# Patient Record
Sex: MALE | Race: WHITE | NOT HISPANIC OR LATINO | Employment: FULL TIME | URBAN - METROPOLITAN AREA
[De-identification: names, ages, dates, MRNs, and addresses within clinical notes are randomized per-mention and may not be internally consistent; named-entity substitution may affect disease eponyms.]

---

## 2017-07-08 ENCOUNTER — OFFICE VISIT (OUTPATIENT)
Dept: URGENT CARE | Facility: URGENT CARE | Age: 22
End: 2017-07-08
Payer: COMMERCIAL

## 2017-07-08 ENCOUNTER — HOSPITAL ENCOUNTER (EMERGENCY)
Facility: CLINIC | Age: 22
Discharge: HOME | End: 2017-07-08
Attending: FAMILY MEDICINE | Admitting: FAMILY MEDICINE
Payer: COMMERCIAL

## 2017-07-08 ENCOUNTER — APPOINTMENT (OUTPATIENT)
Dept: ULTRASOUND IMAGING | Facility: CLINIC | Age: 22
End: 2017-07-08
Attending: FAMILY MEDICINE
Payer: COMMERCIAL

## 2017-07-08 ENCOUNTER — APPOINTMENT (OUTPATIENT)
Dept: CT IMAGING | Facility: CLINIC | Age: 22
End: 2017-07-08
Attending: FAMILY MEDICINE
Payer: COMMERCIAL

## 2017-07-08 VITALS
HEIGHT: 72 IN | DIASTOLIC BLOOD PRESSURE: 60 MMHG | OXYGEN SATURATION: 100 % | BODY MASS INDEX: 21.67 KG/M2 | WEIGHT: 160 LBS | TEMPERATURE: 97.3 F | HEART RATE: 66 BPM | SYSTOLIC BLOOD PRESSURE: 122 MMHG

## 2017-07-08 VITALS
HEART RATE: 66 BPM | TEMPERATURE: 97.5 F | RESPIRATION RATE: 18 BRPM | OXYGEN SATURATION: 98 % | BODY MASS INDEX: 21.65 KG/M2 | WEIGHT: 159.61 LBS | SYSTOLIC BLOOD PRESSURE: 119 MMHG | DIASTOLIC BLOOD PRESSURE: 81 MMHG

## 2017-07-08 DIAGNOSIS — N20.0 KIDNEY STONES: ICD-10-CM

## 2017-07-08 DIAGNOSIS — R30.0 DYSURIA: ICD-10-CM

## 2017-07-08 DIAGNOSIS — N50.812 LEFT TESTICULAR PAIN: Primary | ICD-10-CM

## 2017-07-08 LAB
ALBUMIN UR-MCNC: NEGATIVE MG/DL
ALBUMIN UR-MCNC: NEGATIVE MG/DL
ANION GAP SERPL CALCULATED.3IONS-SCNC: 5 MMOL/L (ref 3–14)
APPEARANCE UR: CLEAR
APPEARANCE UR: CLEAR
BASOPHILS # BLD AUTO: 0 10E9/L (ref 0–0.2)
BASOPHILS NFR BLD AUTO: 0.1 %
BILIRUB UR QL STRIP: NEGATIVE
BILIRUB UR QL STRIP: NEGATIVE
BUN SERPL-MCNC: 14 MG/DL (ref 7–30)
CALCIUM SERPL-MCNC: 9.3 MG/DL (ref 8.5–10.1)
CHLORIDE SERPL-SCNC: 108 MMOL/L (ref 94–109)
CO2 SERPL-SCNC: 28 MMOL/L (ref 20–32)
COLOR UR AUTO: YELLOW
COLOR UR AUTO: YELLOW
CREAT SERPL-MCNC: 1.04 MG/DL (ref 0.66–1.25)
DIFFERENTIAL METHOD BLD: ABNORMAL
EOSINOPHIL # BLD AUTO: 0 10E9/L (ref 0–0.7)
EOSINOPHIL NFR BLD AUTO: 0 %
ERYTHROCYTE [DISTWIDTH] IN BLOOD BY AUTOMATED COUNT: 12.6 % (ref 10–15)
GFR SERPL CREATININE-BSD FRML MDRD: 88 ML/MIN/1.7M2
GLUCOSE SERPL-MCNC: 129 MG/DL (ref 70–99)
GLUCOSE UR STRIP-MCNC: NEGATIVE MG/DL
GLUCOSE UR STRIP-MCNC: NEGATIVE MG/DL
HCT VFR BLD AUTO: 46.3 % (ref 40–53)
HGB BLD-MCNC: 16 G/DL (ref 13.3–17.7)
HGB UR QL STRIP: ABNORMAL
HGB UR QL STRIP: ABNORMAL
IMM GRANULOCYTES # BLD: 0 10E9/L (ref 0–0.4)
IMM GRANULOCYTES NFR BLD: 0.2 %
KETONES UR STRIP-MCNC: NEGATIVE MG/DL
KETONES UR STRIP-MCNC: NEGATIVE MG/DL
LEUKOCYTE ESTERASE UR QL STRIP: NEGATIVE
LEUKOCYTE ESTERASE UR QL STRIP: NEGATIVE
LYMPHOCYTES # BLD AUTO: 0.7 10E9/L (ref 0.8–5.3)
LYMPHOCYTES NFR BLD AUTO: 4.5 %
MCH RBC QN AUTO: 31.6 PG (ref 26.5–33)
MCHC RBC AUTO-ENTMCNC: 34.6 G/DL (ref 31.5–36.5)
MCV RBC AUTO: 91 FL (ref 78–100)
MONOCYTES # BLD AUTO: 0.6 10E9/L (ref 0–1.3)
MONOCYTES NFR BLD AUTO: 3.7 %
MUCOUS THREADS #/AREA URNS LPF: PRESENT /LPF
NEUTROPHILS # BLD AUTO: 14 10E9/L (ref 1.6–8.3)
NEUTROPHILS NFR BLD AUTO: 91.5 %
NITRATE UR QL: NEGATIVE
NITRATE UR QL: NEGATIVE
NRBC # BLD AUTO: 0 10*3/UL
NRBC BLD AUTO-RTO: 0 /100
PH UR STRIP: 7 PH (ref 5–7)
PH UR STRIP: 7 PH (ref 5–7)
PLATELET # BLD AUTO: 219 10E9/L (ref 150–450)
POTASSIUM SERPL-SCNC: 3.5 MMOL/L (ref 3.4–5.3)
RBC # BLD AUTO: 5.07 10E12/L (ref 4.4–5.9)
RBC #/AREA URNS AUTO: 21 /HPF (ref 0–2)
RBC #/AREA URNS AUTO: NORMAL /HPF (ref 0–2)
SODIUM SERPL-SCNC: 141 MMOL/L (ref 133–144)
SP GR UR STRIP: 1.01 (ref 1–1.03)
SP GR UR STRIP: 1.02 (ref 1–1.03)
SQUAMOUS #/AREA URNS AUTO: <1 /HPF (ref 0–1)
URN SPEC COLLECT METH UR: ABNORMAL
URN SPEC COLLECT METH UR: ABNORMAL
UROBILINOGEN UR STRIP-ACNC: 0.2 EU/DL (ref 0.2–1)
UROBILINOGEN UR STRIP-MCNC: NORMAL MG/DL (ref 0–2)
WBC # BLD AUTO: 15.3 10E9/L (ref 4–11)
WBC #/AREA URNS AUTO: 1 /HPF (ref 0–2)
WBC #/AREA URNS AUTO: NORMAL /HPF (ref 0–2)

## 2017-07-08 PROCEDURE — 87491 CHLMYD TRACH DNA AMP PROBE: CPT | Performed by: PHYSICIAN ASSISTANT

## 2017-07-08 PROCEDURE — 96360 HYDRATION IV INFUSION INIT: CPT | Performed by: FAMILY MEDICINE

## 2017-07-08 PROCEDURE — 99285 EMERGENCY DEPT VISIT HI MDM: CPT | Mod: 25 | Performed by: FAMILY MEDICINE

## 2017-07-08 PROCEDURE — 85025 COMPLETE CBC W/AUTO DIFF WBC: CPT | Performed by: FAMILY MEDICINE

## 2017-07-08 PROCEDURE — 81001 URINALYSIS AUTO W/SCOPE: CPT | Performed by: FAMILY MEDICINE

## 2017-07-08 PROCEDURE — 99214 OFFICE O/P EST MOD 30 MIN: CPT | Performed by: PHYSICIAN ASSISTANT

## 2017-07-08 PROCEDURE — 99284 EMERGENCY DEPT VISIT MOD MDM: CPT | Mod: Z6 | Performed by: FAMILY MEDICINE

## 2017-07-08 PROCEDURE — 87591 N.GONORRHOEAE DNA AMP PROB: CPT | Performed by: PHYSICIAN ASSISTANT

## 2017-07-08 PROCEDURE — 81001 URINALYSIS AUTO W/SCOPE: CPT | Performed by: PHYSICIAN ASSISTANT

## 2017-07-08 PROCEDURE — 93976 VASCULAR STUDY: CPT

## 2017-07-08 PROCEDURE — 25000128 H RX IP 250 OP 636: Performed by: FAMILY MEDICINE

## 2017-07-08 PROCEDURE — 96361 HYDRATE IV INFUSION ADD-ON: CPT | Performed by: FAMILY MEDICINE

## 2017-07-08 PROCEDURE — 74176 CT ABD & PELVIS W/O CONTRAST: CPT

## 2017-07-08 PROCEDURE — 80048 BASIC METABOLIC PNL TOTAL CA: CPT | Performed by: FAMILY MEDICINE

## 2017-07-08 RX ORDER — SODIUM CHLORIDE 9 MG/ML
1000 INJECTION, SOLUTION INTRAVENOUS CONTINUOUS
Status: DISCONTINUED | OUTPATIENT
Start: 2017-07-08 | End: 2017-07-08 | Stop reason: HOSPADM

## 2017-07-08 RX ORDER — LIDOCAINE 40 MG/G
CREAM TOPICAL
Status: DISCONTINUED | OUTPATIENT
Start: 2017-07-08 | End: 2017-07-08 | Stop reason: HOSPADM

## 2017-07-08 RX ORDER — ONDANSETRON 2 MG/ML
4 INJECTION INTRAMUSCULAR; INTRAVENOUS EVERY 30 MIN PRN
Status: DISCONTINUED | OUTPATIENT
Start: 2017-07-08 | End: 2017-07-08 | Stop reason: HOSPADM

## 2017-07-08 RX ADMIN — SODIUM CHLORIDE 1000 ML: 9 INJECTION, SOLUTION INTRAVENOUS at 18:27

## 2017-07-08 RX ADMIN — SODIUM CHLORIDE 1000 ML: 9 INJECTION, SOLUTION INTRAVENOUS at 17:32

## 2017-07-08 ASSESSMENT — ENCOUNTER SYMPTOMS
ABDOMINAL PAIN: 0
BACK PAIN: 0
SHORTNESS OF BREATH: 0
NAUSEA: 1
FEVER: 0
HEMATURIA: 0
FREQUENCY: 0

## 2017-07-08 NOTE — ED PROVIDER NOTES
History     Chief Complaint   Patient presents with     Testicular/scrotal Pain     HPI  Stoney Law is a 22 year old male who presents to the ED complaining of severe left-sided testicle pain. The patient reports that he developed the pain while sitting at about noon today. He states that he has never experienced something like this before. He denies any urinary problems, back pain, or history of kidney stones. He reports that he is mildly nauseated and the pain has decreased in its severity and quality, from sharp to dull.   Patient was initially evaluated at [facility].  Urinalysis was done which was negative patient transferred here for further evaluation of testicular issues.  She denies any history of dysuric symptoms.  No STD history.  No change in bowel or bladder habits etc. no reports of blood in urine or frequency no fevers noted.  Notes symptoms are somewhat improving in the ER.  No history of testicular torsion or epididymitis.    Family history father had history of kidney stones.    PAST MEDICAL HISTORY:   Past Medical History:   Diagnosis Date     Chicken pox 11/07     NO ACTIVE PROBLEMS      Pneumonia, lobar (H) 8/2011       PAST SURGICAL HISTORY:   Past Surgical History:   Procedure Laterality Date     none         FAMILY HISTORY:   Family History   Problem Relation Age of Onset     CANCER Paternal Grandfather      Leukemia     Cancer - colorectal Maternal Grandmother        SOCIAL HISTORY:   Social History   Substance Use Topics     Smoking status: Never Smoker     Smokeless tobacco: Never Used     Alcohol use No       There are no discharge medications for this patient.       No Known Allergies       I have reviewed the Medications, Allergies, Past Medical and Surgical History, and Social History in the Epic system.    Review of Systems   Constitutional: Positive for appetite change (had some nausea is somewhat improve) and diaphoresis. Negative for activity change and fever.   HENT:  Negative for congestion.    Eyes: Negative for redness and visual disturbance.   Respiratory: Negative for chest tightness and shortness of breath.    Cardiovascular: Negative for chest pain.   Gastrointestinal: Positive for nausea. Negative for abdominal pain.   Genitourinary: Positive for testicular pain (left sided). Negative for difficulty urinating, frequency, hematuria and urgency.   Musculoskeletal: Negative for arthralgias, back pain and neck stiffness.   Skin: Negative for color change and rash.   Allergic/Immunologic: Negative for immunocompromised state.   Neurological: Negative for syncope, weakness and headaches.   Hematological: Does not bruise/bleed easily.   Psychiatric/Behavioral: Negative for confusion, decreased concentration and dysphoric mood.   All other systems reviewed and are negative.      Physical Exam   BP: 126/85  Pulse: 66  Temp: 97.5  F (36.4  C)  Resp: 18  Weight: 72.4 kg (159 lb 9.8 oz)  SpO2: 100 %  Physical Exam   Constitutional: He is oriented to person, place, and time. He appears well-developed and well-nourished. He appears distressed.   Patient mildly pale minimal distress symptoms are improving does not want anything for pain   HENT:   Head: Normocephalic and atraumatic.   Eyes: Conjunctivae and EOM are normal. Pupils are equal, round, and reactive to light. No scleral icterus.   Neck: Normal range of motion. Neck supple.   Cardiovascular: Regular rhythm.    Pulmonary/Chest: No respiratory distress.   Abdominal: He exhibits no distension and no mass. There is no tenderness. There is no rebound and no guarding.   Genitourinary:   Genitourinary Comments: Circumcise male testicles descended ×2.  There is no abnormality in the plane of the testicle as it is lying in the appropriate plane.  There is no skin changes noted no lesions identified no marked tenderness no swelling no epididymal tenderness.  No mass palpable   Musculoskeletal: He exhibits no edema, tenderness or  deformity.   Neurological: He is alert and oriented to person, place, and time. He has normal reflexes. No cranial nerve deficit. Coordination normal.   Skin: Skin is warm and dry. No rash noted. He is not diaphoretic. No erythema. No pallor.   Psychiatric: He has a normal mood and affect. His behavior is normal. Judgment and thought content normal.   Nursing note and vitals reviewed.      ED Course     ED Course       I talked to the [facility] patient did arrive.  Parents with the patient.  Initial plan we establish an IV gave the patient liter normal saline.  Urinalysis ordered along with labs.  Testicular ultrasound was done.  Findings per radiology no sign of torsion no increasing flow to represent epididymitis.  There is an area of superficial vein dilation with decreased flow possible thrombophlebitis no other abnormalities no cyst or mass noted.  Laboratory evaluation white count was 15.3 patient does not have a fever urinalysis revealed 21 red cells without any white cells or sign of infection.  Glucose 129 creatinine 1.04.    Patient with this history of CT scan done noncontrast abdomen pelvis revealing bilateral renal stones was no ureteral or bladder stones seen there is some slight mild hydro-of the left collecting system consistent with possible passed stone.  Discussed with radiology and also urology down here in the ER.    Discussing with patient with his story he states the pain initially began in his kidney area and wrapped around to his groin symptoms now seem more consistent with renal colic and more likely he did pass a stone at this point patient feels fine now symptoms are gone discussed with CT findings and discharged home and returning if fever chills worsening pain treating with ibuprofen patient agrees with plan and discharged with parents.      Procedures             Critical Care time:  none               Labs Ordered and Resulted from Time of ED Arrival Up to the Time of Departure  from the ED   CBC WITH PLATELETS DIFFERENTIAL - Abnormal; Notable for the following:        Result Value    WBC 15.3 (*)     Absolute Neutrophil 14.0 (*)     Absolute Lymphocytes 0.7 (*)     All other components within normal limits   BASIC METABOLIC PANEL - Abnormal; Notable for the following:     Glucose 129 (*)     All other components within normal limits   UA MACROSCOPIC WITH REFLEX TO MICRO AND CULTURE - Abnormal; Notable for the following:     Blood Urine Small (*)     RBC Urine 21 (*)     Mucous Urine Present (*)     All other components within normal limits   PULSE OXIMETRY NURSING   PERIPHERAL IV CATHETER     Results for orders placed or performed during the hospital encounter of 07/08/17 (from the past 24 hour(s))   US Testicular & Scrotum w Doppler Ltd    Narrative    EXAMINATION: US TESTICULAR AND SCROTUM WITH DOPPLER LIMITED  7/8/2017  5:20 PM      CLINICAL HISTORY: Left scrotal pain      COMPARISON: None        PROCEDURE COMMENTS: Ultrasound of the scrotum was performed with color  and spectral Doppler.    FINDINGS:  Right testis: 4.1 x 2.2 x 2.8 cm, volume of 13.1 mL.  Left testis: 4.0 x 2.1 x 2.6 cm, volume of 11.4 mL.    The testes are normal in size, shape, and echotexture, and are located  within the scrotum. The epididymides are normal. There is no  hydrocele, varicocele, or abnormal mass. Normal testicular blood flow  as documented by both color Doppler evaluation and spectral Doppler  waveforms.  There is no evidence of testicular torsion.    There is superficial vein dilatation of the left lateral testicle  measures 3 mm, not significantly change with Valsalva maneuver. No  color flow was demonstrated in the left medial testicle vein as well  as the superior medial vein adjacent to the groin.      Impression    IMPRESSION:  1.  No evidence for testicular torsion.  2.  Left testicle superficial vein dilatation, some of which does not  respond to Valsalva maneuver and with no demonstrated  Doppler flow,  may represent thrombosed varicocele.    Finding was identified on 7/8/2017 6:24 PM.     Dr. Hernandez was contacted by Dr. Miranda at 7/8/2017 7:59 PM and verbalized  understanding of the urgent finding.     I have personally reviewed the examination and initial interpretation  and I agree with the findings.    HYUN LASSITER MD   CBC with platelets differential   Result Value Ref Range    WBC 15.3 (H) 4.0 - 11.0 10e9/L    RBC Count 5.07 4.4 - 5.9 10e12/L    Hemoglobin 16.0 13.3 - 17.7 g/dL    Hematocrit 46.3 40.0 - 53.0 %    MCV 91 78 - 100 fl    MCH 31.6 26.5 - 33.0 pg    MCHC 34.6 31.5 - 36.5 g/dL    RDW 12.6 10.0 - 15.0 %    Platelet Count 219 150 - 450 10e9/L    Diff Method Automated Method     % Neutrophils 91.5 %    % Lymphocytes 4.5 %    % Monocytes 3.7 %    % Eosinophils 0.0 %    % Basophils 0.1 %    % Immature Granulocytes 0.2 %    Nucleated RBCs 0 0 /100    Absolute Neutrophil 14.0 (H) 1.6 - 8.3 10e9/L    Absolute Lymphocytes 0.7 (L) 0.8 - 5.3 10e9/L    Absolute Monocytes 0.6 0.0 - 1.3 10e9/L    Absolute Eosinophils 0.0 0.0 - 0.7 10e9/L    Absolute Basophils 0.0 0.0 - 0.2 10e9/L    Abs Immature Granulocytes 0.0 0 - 0.4 10e9/L    Absolute Nucleated RBC 0.0    Basic metabolic panel   Result Value Ref Range    Sodium 141 133 - 144 mmol/L    Potassium 3.5 3.4 - 5.3 mmol/L    Chloride 108 94 - 109 mmol/L    Carbon Dioxide 28 20 - 32 mmol/L    Anion Gap 5 3 - 14 mmol/L    Glucose 129 (H) 70 - 99 mg/dL    Urea Nitrogen 14 7 - 30 mg/dL    Creatinine 1.04 0.66 - 1.25 mg/dL    GFR Estimate 88 >60 mL/min/1.7m2    GFR Estimate If Black >90   GFR Calc   >60 mL/min/1.7m2    Calcium 9.3 8.5 - 10.1 mg/dL   UA reflex to Microscopic and Culture   Result Value Ref Range    Color Urine Yellow     Appearance Urine Clear     Glucose Urine Negative NEG mg/dL    Bilirubin Urine Negative NEG    Ketones Urine Negative NEG mg/dL    Specific Gravity Urine 1.008 1.003 - 1.035    Blood Urine Small (A) NEG     pH Urine 7.0 5.0 - 7.0 pH    Protein Albumin Urine Negative NEG mg/dL    Urobilinogen mg/dL Normal 0.0 - 2.0 mg/dL    Nitrite Urine Negative NEG    Leukocyte Esterase Urine Negative NEG    Source Clean catch urine     RBC Urine 21 (H) 0 - 2 /HPF    WBC Urine 1 0 - 2 /HPF    Squamous Epithelial /HPF Urine <1 0 - 1 /HPF    Mucous Urine Present (A) NEG /LPF   Abd/pelvis CT no contrast - Stone Protocol    Narrative    EXAMINATION: CT ABDOMEN PELVIS W/O CONTRAST  7/8/2017 3:28 PM      CLINICAL HISTORY: Abdominal or flank pain, possible stone    COMPARISON: CT abdomen pelvis dated 1/3/2005    PROCEDURE COMMENTS: CT of the abdomen was performed intravenous and  oral contrast. Coronal and sagittal reformatted images were obtained.    FINDINGS:  Lower thorax:   Unremarkable.    Abdomen and pelvis:  There are several nonobstructive punctate papillary renal calculi  bilaterally, largest one measures 4 x 3 mm on left and 2 mm on the  right. There is no hydronephrosis or hydroureter.     The liver and biliary system, spleen, and pancreas are normal in  appearance. The adrenal glands are normal in appearance.    There are no abnormally dilated or thickened loops of small bowel or  colon. No evidence for appendicitis. There is no free air or free  fluid. There are no abnormally sized lymph nodes.    Osseous structures:   Normal.      Impression    IMPRESSION:  Nonobstructive bilateral 2 to 4 mm renal calculi bilaterally without  evidence for hydronephrosis or hydroureter.     I have personally reviewed the examination and initial interpretation  and I agree with the findings.    HYUN LASSITER MD              Assessments & Plan (with Medical Decision Making)  22-year-old male presents with left testicular pain which now actually started out as left flank radiating to the testicle he initially seen in the clinic they were concerned about torsion and ultrasound done here which did not show sign of torsion or epididymitis  patient has a severity small vein superficial with some dilation questionable decreased flow possible thrombophlebitis but there no skin changes on examination his test exam is completely normal.  CT scan showed some mild dilatation of the collecting system on the left 2 renal stones seen in the kidneys no ureteral stones discussed with urology more likely symptoms consistent with passed kidney stone.  Patient feeling fine now without symptoms discharged home encouraging fluids ibuprofen and return if fevers or chills and is aware he has 2 more renal stones and may have some issues in the future.  Patient discharged with parents agrees with plan.           I have reviewed the nursing notes.    I have reviewed the findings, diagnosis, plan and need for follow up with the patient.    There are no discharge medications for this patient.      Final diagnoses:   Kidney stones (passed)   I, Jevon Mir, am serving as a trained medical scribe to document services personally performed by Gerald Hernandez MD, based on the provider's statements to me.      IGerald MD, was physically present and have reviewed and verified the accuracy of this note documented by Jevon Mir.       7/8/2017   [facility] EMERGENCY DEPARTMENT    This note was created at least in part by the use of dragon voice dictation system. Inadvertent typographical errors may still exist.  Gerald Hernandez MD.         Gerald Hernandez MD  07/09/17 3047

## 2017-07-08 NOTE — NURSING NOTE
Stoney Law;   Chief Complaint   Patient presents with     Abdominal Pain     left lower quadrant pain - onset 12 today, feeling very nauseated, denies dysuria - last BM 11 today which was normal for him      Urgent Care     Initial /60 (BP Location: Right arm, Patient Position: Chair, Cuff Size: Adult Regular)  Pulse 66  Temp 97.3  F (36.3  C) (Oral)  Ht 6' (1.829 m)  Wt 160 lb (72.6 kg)  SpO2 100%  BMI 21.7 kg/m2 Estimated body mass index is 21.7 kg/(m^2) as calculated from the following:    Height as of this encounter: 6' (1.829 m).    Weight as of this encounter: 160 lb (72.6 kg)..  BP completed using cuff size regular.  Yolanda Victoria R.N.

## 2017-07-08 NOTE — PROGRESS NOTES
"Stoney Law is a 22 year old male here for evaluation of left groin, testicular and scrotal pain. Pain has been present for 4 hours - onset acute and duration: constant. Pain is decribed as sharp, stabbing and \"pain\".  Associated symptoms: none.  He denies urinary symptoms to include gross hematuria.    Patient tried nothing for pain relief.   OBJECTIVE: /60 (BP Location: Right arm, Patient Position: Chair, Cuff Size: Adult Regular)  Pulse 66  Temp 97.3  F (36.3  C) (Oral)  Ht 6' (1.829 m)  Wt 160 lb (72.6 kg)  SpO2 100%  BMI 21.7 kg/m2  EXAM  /60 (BP Location: Right arm, Patient Position: Chair, Cuff Size: Adult Regular)  Pulse 66  Temp 97.3  F (36.3  C) (Oral)  Ht 6' (1.829 m)  Wt 160 lb (72.6 kg)  SpO2 100%  BMI 21.7 kg/m2  Estimated body mass index is 21.7 kg/(m^2) as calculated from the following:    Height as of this encounter: 6' (1.829 m).    Weight as of this encounter: 160 lb (72.6 kg).  GENERAL APPEARANCE: severe distress  RESP: lungs clear to auscultation - no rales, rhonchi or wheezes  CV: regular rates and rhythm, normal S1 S2, no S3 or S4 and no murmur, click or rub -  ABDOMEN: no palpable masses and soft, nontender  GU_male: penis normal without urethral discharge   Left testicle is tender to palpation and examination  Doesn't appear high riding  Patient doesn't allow hernia exam in inguinal canal  Also localizes radiation of pain to left lower quadrant/groin without obvious mass of ventral wall protrusion  SKIN: no suspicious lesions or rashes    Lab:  Results for orders placed or performed in visit on 07/08/17   *UA reflex to Microscopic and Culture ([place] [facility] (except [place] and [place])   Result Value Ref Range    Color Urine Yellow     Appearance Urine Clear     Glucose Urine Negative NEG mg/dL    Bilirubin Urine Negative NEG    Ketones Urine Negative NEG mg/dL    Specific Gravity Urine 1.025 1.003 - 1.035    Blood Urine Trace (A) NEG    pH Urine 7.0 " 5.0 - 7.0 pH    Protein Albumin Urine Negative NEG mg/dL    Urobilinogen Urine 0.2 0.2 - 1.0 EU/dL    Nitrite Urine Negative NEG    Leukocyte Esterase Urine Negative NEG    Source Midstream Urine    Urine Microscopic   Result Value Ref Range    WBC Urine O - 2 0 - 2 /HPF    RBC Urine O - 2 0 - 2 /HPF       ASSESSMENT:  Left testicular and inguinal pain    PLAN:    Multiple etiologies and diagnoses were considered to include referred pain.  Testicular/Scotal Ultrasound is needed because of the severe testicular pain  Referral to ER for emergent and definitive evaluation and treatment  Called and gave report to ER. Parents will drive to ER.

## 2017-07-08 NOTE — ED AVS SNAPSHOT
[facility], [place], Emergency Department    [address] [address]    Phone:  [phone]                                       Stoney Law   MRN: [id number]    Department:  [facility], Emergency Department   Date of Visit:  7/8/2017           After Visit Summary Signature Page     I have received my discharge instructions, and my questions have been answered. I have discussed any challenges I see with this plan with the nurse or doctor.    ..........................................................................................................................................  Patient/Patient Representative Signature      ..........................................................................................................................................  Patient Representative Print Name and Relationship to Patient    ..................................................               ................................................  Date                                            Time    ..........................................................................................................................................  Reviewed by Signature/Title    ...................................................              ..............................................  Date                                                            Time

## 2017-07-08 NOTE — MR AVS SNAPSHOT
"              After Visit Summary   2017    Stoney Law    MRN: [id number]           Patient Information     Date Of Birth          1995        Visit Information        Provider Department      2017 2:45 PM Sudhakar Paniagua PA-C [facility]        Today's Diagnoses     Left testicular pain    -  1    Dysuria           Follow-ups after your visit        Who to contact     If you have questions or need follow up information about today's clinic visit or your schedule please contact [facility] directly at [phone].  Normal or non-critical lab and imaging results will be communicated to you by [employer], letter or phone within 4 business days after the clinic has received the results. If you do not hear from us within 7 days, please contact the clinic through [employer] or phone. If you have a critical or abnormal lab result, we will notify you by phone as soon as possible.  Submit refill requests through [employer] or call your pharmacy and they will forward the refill request to us. Please allow 3 business days for your refill to be completed.          Additional Information About Your Visit        MyChart Information     [employer] lets you send messages to your doctor, view your test results, renew your prescriptions, schedule appointments and more. To sign up, go to [email or web address] . Click on \"Log in\" on the left side of the screen, which will take you to the Welcome page. Then click on \"Sign up Now\" on the right side of the page.     You will be asked to enter the access code listed below, as well as some personal information. Please follow the directions to create your username and password.     Your access code is: [email or web address]  Expires: 10/6/2017  9:16 PM     Your access code will  in 90 days. If you need help or a new code, please call your [place] clinic or [phone].        Care EveryWhere ID     This is your Care EveryWhere ID. This could " be used by other organizations to access your [facility] medical records  [id number]        Your Vitals Were     Pulse Temperature Height Pulse Oximetry BMI (Body Mass Index)       66 97.3  F (36.3  C) (Oral) 6' (1.829 m) 100% 21.7 kg/m2        Blood Pressure from Last 3 Encounters:   07/08/17 119/81   07/08/17 122/60   09/26/11 100/70    Weight from Last 3 Encounters:   07/08/17 159 lb 9.8 oz (72.4 kg)   07/08/17 160 lb (72.6 kg)   09/26/11 140 lb (63.5 kg) (43 %)*     * Growth percentiles are based on CDC 2-20 Years data.              We Performed the Following     *UA reflex to Microscopic and Culture ([place] and [facility] (except [place] and [place])     Chlamydia trachomatis PCR     Neisseria gonorrhoeae PCR     Urine Microscopic          Today's Medication Changes          These changes are accurate as of: 7/8/17 11:59 PM.  If you have any questions, ask your nurse or doctor.               Stop taking these medicines if you haven't already. Please contact your care team if you have questions.     clindamycin 1 % lotion   Commonly known as:  CLEOCIN T   Stopped by:  Sudhakar Paniagua PA-C           minocycline 100 MG tablet   Commonly known as:  DYNACIN   Stopped by:  Sudhakar Paniagua PA-C                    Primary Care Provider Office Phone # Fax #    [facility] [phone]       No address on file        Equal Access to Services     PAULINE GTZ AH: Hadii dulce maria villaseñoro Soalvaroali, waaxda luqadaha, qaybta kaalmada adeegyada, wax irineo espinal adeguilherme ledesma . So [facility] [phone].    ATENCIÓN: Si habla español, tiene a chauhan disposición servicios gratuitos de asistencia lingüística. Llame al [phone].    We comply with applicable federal civil rights laws and [place] laws. We do not discriminate on the basis of race, color, national origin, age, disability sex, sexual orientation or gender identity.            Thank you!     Thank you for choosing [facility]  URGENT CARE  for your care. Our goal is always to provide you with excellent care. Hearing back from our patients is one way we can continue to improve our services. Please take a few minutes to complete the written survey that you may receive in the mail after your visit with us. Thank you!             Your Updated Medication List - Protect others around you: Learn how to safely use, store and throw away your medicines at www.disposemymeds.org.      Notice  As of 7/8/2017 11:59 PM    You have not been prescribed any medications.

## 2017-07-08 NOTE — ED NOTES
Arrived to ED d/t c/o left testicular pain that radiates up into groin area, was seen at clinic and referred to ED for ultrasound, denies any swelling to area, VSS upon arrival to triage, afebrile

## 2017-07-08 NOTE — ED AVS SNAPSHOT
[facility], Emergency Department    [address] [facility] [address]    Phone:  [phone]                                       Stoney Law   MRN: [id number]    Department:  [facility], Emergency Department   Date of Visit:  7/8/2017           Patient Information     Date Of Birth          1995        Your diagnoses for this visit were:     Kidney stones (passed)        You were seen by Gerald Hernandez MD.      Follow-up Information     Follow up with Eusebio Mckeon Md.        Discharge Instructions           Home.  Your urine shows red blood cells without infection, more consistent with kidney stone.  Your ultrasound did not show any torsion or decreased blood flow to testicles.  Push fluids.  Ibuprofen for pain.  Return if severe pain, fever or any other concerns.  See MD for follow up as needed.          Kidney Stone, Passed    A kidney stone (nephrolithiasis) starts as tiny crystals that form inside the kidney where urine is made. Most kidney stones enlarge to about 1/8 to 1/4 inch in size before leaving the kidney and moving toward the bladder. The sharp, cramping pain and nausea/vomiting you had was the stone moving through the ureter (the narrow tube joining the kidney to the bladder). Once the stone reaches your bladder, the pain stops. Pain may start again as the stone passes through the bladder and out through the urethra. There are 4 types of kidney stones. Eighty percent are calcium stones--mostly calcium oxalate but also some with calcium phosphate. The other 3 types include uric acid stones, struvite stones (from a preceding infection), and rarely, cystine stones.  Home care  The following guidelines will help you care for yourself at home:    Drink plenty of fluids. This increases urine flow and reduces the chance that a new stone will form. Healthy adults (no heart/liver/kidney disease) who have had a kidney stone should drink 12, 8-ounce glasses of fluids per day. Most of  this should be water. The goal is to produce 1.5 to 2 quarts of almost colorless urine per 24 hours.    Unless another NSAID (non-steroidal anti-inflammatory drug) was given, you may take ibuprofen or naproxen in addition to any narcotic pain medicine your healthcare provider prescribes. If you have chronic liver or kidney disease or ever had a stomach ulcer or GI bleeding, talk with your healthcare provider before using these medicines.    Collecting the stone: If you were given a strainer, urinate into a jar then pour the urine through the strainer and into the toilet. Keep doing this for 24 hours after your pain stops. Save any stone that you find in the strainer and bring it to your healthcare provider for analysis. If you do not collect a stone, a 24-hour urine specimen can be done at a later time by your healthcare provider to figure out the cause of your stone.  Prevention  Each year, there is a chance that a new stone will form (50% chance over the next 5 to 7 years). The risk is highest if you have a family history of kidney stones or have certain chronic illnesses such as hypertension, obesity, or diabetes. However, there are lifestyle and dietary changes that you can make to reduce the risk of getting another kidney stone.  Most kidney stones are made of calcium. The following advice can help you prevent calcium stones. If you don t know the type of stone you have, follow this advice until the healthcare provider determines the cause of your stone.  Things that help:    The most important thing you can do is to drink plenty of fluids each day, as described above.    Certain foods, such as wheat, rice, rye, barley, and beans, contain phytate. Phytate is a compound that may lower the risk of recurrence of any type of stone.    Eat more fruits and vegetables, especially those high in potassium.    Eat foods high in natural citrate like fruit and fruit juices (using low sugar).    Low calcium contributes to  calcium type kidney stones. Eat a normal calcium diet and talk with your healthcare provider if you are taking calcium supplements. It may be detrimental to lower your calcium intake. New research shows that eating calcium-rich and oxalate-rich foods together lowers your risk of stones. This is because eating these foods together binds the minerals in the stomach and intestines before they can reach the kidneys.    Limit salt intake to 2 grams (1 teaspoon) per day. Use limited amounts when cooking, and don t add salt at the table. Processed and canned foods are usually high in salt.    Spinach, rhubarb, peanuts, cashews, almonds, grapefruit, and grapefruit juice are all high oxalate foods and should be reduced, or eaten with calcium-rich foods. These foods include dairy, dark leafy greens, soy products, calcium-enriched foods, and others.    Reduce the amount of animal meat and high protein foods in your diet. This may lower your risk of uric acid stones.    Avoid excess sugar (sucrose) and fructose (sweetener in many soft drinks) in your diet.    If you take vitamin C as a supplement, don't take more than 1,000 milligrams (mg) per day.    A dietitian or your healthcare provider can give you ideas on how to change your diet to prevent more kidney stones.  Follow-up care  Follow up with your healthcare provider, or as advised. Even if you don't collect the kidney stone, it is possible to analyze a 24-hour urine collection for the cause of this stone. Discuss this with your healthcare provider.  If you had an X-ray, CT scan, or other diagnostic test, you will be told of any findings that may affect your care.  Call 911  Call 911 if you have any of these:    Weakness, dizziness, or fainting  When to seek medical advice  Call your healthcare provider if any of the these occur:    Severe pain that returns and not relieved by pain medicines    Repeated vomiting or unable to keep down fluids    Fever of 100.4 F (38 C) or  "higher, or as directed by your healthcare provider    Blood clots in urine    Foul smelling or cloudy urine    Unable to pass urine for 8 hours or increasing bladder pressure  Date Last Reviewed: 10/1/2016    9380-1685 [employer]. [address] [place] [address], [place] [address]. All rights reserved. This information is not intended as a substitute for professional medical care. Always follow your healthcare professional's instructions.           * KIDNEY STONE (w/ Colic)    The sharp cramping pain and nausea/vomiting that you have is due to a small stone which has formed in the kidney and is now passing down a narrow tube (ureter) on its way to your bladder. Once it reaches your bladder, the pain will stop. The stone may pass in your urine stream in one piece. [The size may be 1/16\" to 1/4\" (1-6mm)]. Or, the stone may also break up into yelitza fragments which you may not even notice.  Once you have had a kidney stone there is a risk for recurrence in the future.  HOME CARE:    Drink lots of fluid (at least 8-10 glasses of water a day).    Most stones will pass on their own, but may take from a few hours to a few days.    Each time you urinate, do so in a jar. Pour the urine from the jar through the strainer and into the toilet. Continue doing this until 24 hours after your pain stops. By then, if there was a kidney stone, it should pass from your bladder. Some stones dissolve into sand-like particles and pass right through the strainer. In that case, you won't ever see a stone.    Save any stone that you find in the strainer and bring it to your doctor for analysis. It may be possible to prevent certain types of stones from forming. Therefore, it is important to know what kind of stone you have.    Try to stay as active as possible since this will help the stone pass. Do not stay in bed unless your pain prevents you from getting up. You may notice a red, pink or brown color to your urine. This is normal " while passing a kidney stone.  FOLLOW UP with your doctor or return to this facility if the pain lasts more than 48 hours.  GET PROMPT MEDICAL ATTENTION if any of the following occur:    Pain that is not controlled by the medicine given    Repeated vomiting or unable to keep down fluids    Weakness, dizziness or fainting    Fever over 101  F (38.3  C)    Passage of solid red or brown urine (can't see through it) or urine with lots of blood clots    Unable to pass urine for 8 hours and increasing bladder pressure    0425-3704 [facility], [address] [place] [address], [place] [address]. All rights reserved. This information is not intended as a substitute for professional medical care. Always follow your healthcare professional's instructions.      24 Hour Appointment Hotline       To make an appointment at any [facility] (formerly [facility]), call [phone] ([phone]). If you don't have a family doctor or clinic, we will help you find one. [facility] are conveniently located to serve the needs of you and your family.             Review of your medicines      Notice     You have not been prescribed any medications.            Procedures and tests performed during your visit     Abd/pelvis CT no contrast - Stone Protocol    Basic metabolic panel    CBC with platelets differential    Peripheral IV catheter    Pulse oximetry nursing    UA reflex to Microscopic and Culture    US Testicular & Scrotum w Doppler Ltd      Orders Needing Specimen Collection     None      Pending Results     Date and Time Order Name Status Description    7/8/2017 1758 Abd/pelvis CT no contrast - Stone Protocol In process     7/8/2017 1635 US Testicular & Scrotum w Doppler Ltd In process     7/8/2017 1510 NEISSERIA GONORRHOEAE PCR In process     7/8/2017 1510 CHLAMYDIA TRACHOMATIS PCR In process             Pending Culture Results     Date and Time Order Name Status Description    7/8/2017 1510 NEISSERIA GONORRHOEAE PCR In process     7/8/2017  "1510 CHLAMYDIA TRACHOMATIS PCR In process             Pending Results Instructions     If you had any lab results that were not finalized at the time of your Discharge, you can call the ED Lab Result RN at [phone]. You will be contacted by this team for any positive Lab results or changes in treatment. The nurses are available 7 days a week from 10A to 6:30P.  You can leave a message 24 hours per day and they will return your call.        Thank you for choosing [facility]       Thank you for choosing [facility] for your care. Our goal is always to provide you with excellent care. Hearing back from our patients is one way we can continue to improve our services. Please take a few minutes to complete the written survey that you may receive in the mail after you visit with us. Thank you!        [employer] Information     [employer] lets you send messages to your doctor, view your test results, renew your prescriptions, schedule appointments and more. To sign up, go to [email or web address] . Click on \"Log in\" on the left side of the screen, which will take you to the Welcome page. Then click on \"Sign up Now\" on the right side of the page.     You will be asked to enter the access code listed below, as well as some personal information. Please follow the directions to create your username and password.     Your access code is: [email or web address]  Expires: 10/6/2017  9:16 PM     Your access code will  in 90 days. If you need help or a new code, please call your [facility] clinic or [phone].        Care EveryWhere ID     This is your Care EveryWhere ID. This could be used by other organizations to access your [facility] medical records  [id number]        Equal Access to Services     [facility] Center[facility]IN : Hadii dulce maria Husain, behzad jaime, abby morales. So [facility] [phone].    ATENCIÓN: Si habla español, tiene a chauhan disposición servicios gratuitos de asistencia " cande Jassoestrella al [phone].    We comply with applicable federal civil rights laws and [place] laws. We do not discriminate on the basis of race, color, national origin, age, disability sex, sexual orientation or gender identity.            After Visit Summary       This is your record. Keep this with you and show to your community pharmacist(s) and doctor(s) at your next visit.

## 2017-07-08 NOTE — ED NOTES
Bed: IN03  Expected date: 7/8/17  Expected time:   Means of arrival:   Comments:  Stoney Law left scrotal pain for 3-4 hours.  To ER to rule out torsion.

## 2017-07-09 ASSESSMENT — ENCOUNTER SYMPTOMS
HEADACHES: 0
ACTIVITY CHANGE: 0
DIAPHORESIS: 1
ARTHRALGIAS: 0
NECK STIFFNESS: 0
BRUISES/BLEEDS EASILY: 0
APPETITE CHANGE: 1
WEAKNESS: 0
CHEST TIGHTNESS: 0
COLOR CHANGE: 0
CONFUSION: 0
DIFFICULTY URINATING: 0
DECREASED CONCENTRATION: 0
EYE REDNESS: 0
DYSPHORIC MOOD: 0

## 2017-07-09 NOTE — DISCHARGE INSTRUCTIONS
Home.  Your urine shows red blood cells without infection, more consistent with kidney stone.  Your ultrasound did not show any torsion or decreased blood flow to testicles.  Push fluids.  Ibuprofen for pain.  Return if severe pain, fever or any other concerns.  See MD for follow up as needed.          Kidney Stone, Passed    A kidney stone (nephrolithiasis) starts as tiny crystals that form inside the kidney where urine is made. Most kidney stones enlarge to about 1/8 to 1/4 inch in size before leaving the kidney and moving toward the bladder. The sharp, cramping pain and nausea/vomiting you had was the stone moving through the ureter (the narrow tube joining the kidney to the bladder). Once the stone reaches your bladder, the pain stops. Pain may start again as the stone passes through the bladder and out through the urethra. There are 4 types of kidney stones. Eighty percent are calcium stones--mostly calcium oxalate but also some with calcium phosphate. The other 3 types include uric acid stones, struvite stones (from a preceding infection), and rarely, cystine stones.  Home care  The following guidelines will help you care for yourself at home:    Drink plenty of fluids. This increases urine flow and reduces the chance that a new stone will form. Healthy adults (no heart/liver/kidney disease) who have had a kidney stone should drink 12, 8-ounce glasses of fluids per day. Most of this should be water. The goal is to produce 1.5 to 2 quarts of almost colorless urine per 24 hours.    Unless another NSAID (non-steroidal anti-inflammatory drug) was given, you may take ibuprofen or naproxen in addition to any narcotic pain medicine your healthcare provider prescribes. If you have chronic liver or kidney disease or ever had a stomach ulcer or GI bleeding, talk with your healthcare provider before using these medicines.    Collecting the stone: If you were given a strainer, urinate into a jar then pour the urine  through the strainer and into the toilet. Keep doing this for 24 hours after your pain stops. Save any stone that you find in the strainer and bring it to your healthcare provider for analysis. If you do not collect a stone, a 24-hour urine specimen can be done at a later time by your healthcare provider to figure out the cause of your stone.  Prevention  Each year, there is a chance that a new stone will form (50% chance over the next 5 to 7 years). The risk is highest if you have a family history of kidney stones or have certain chronic illnesses such as hypertension, obesity, or diabetes. However, there are lifestyle and dietary changes that you can make to reduce the risk of getting another kidney stone.  Most kidney stones are made of calcium. The following advice can help you prevent calcium stones. If you don t know the type of stone you have, follow this advice until the healthcare provider determines the cause of your stone.  Things that help:    The most important thing you can do is to drink plenty of fluids each day, as described above.    Certain foods, such as wheat, rice, rye, barley, and beans, contain phytate. Phytate is a compound that may lower the risk of recurrence of any type of stone.    Eat more fruits and vegetables, especially those high in potassium.    Eat foods high in natural citrate like fruit and fruit juices (using low sugar).    Low calcium contributes to calcium type kidney stones. Eat a normal calcium diet and talk with your healthcare provider if you are taking calcium supplements. It may be detrimental to lower your calcium intake. New research shows that eating calcium-rich and oxalate-rich foods together lowers your risk of stones. This is because eating these foods together binds the minerals in the stomach and intestines before they can reach the kidneys.    Limit salt intake to 2 grams (1 teaspoon) per day. Use limited amounts when cooking, and don t add salt at the  table. Processed and canned foods are usually high in salt.    Spinach, rhubarb, peanuts, cashews, almonds, grapefruit, and grapefruit juice are all high oxalate foods and should be reduced, or eaten with calcium-rich foods. These foods include dairy, dark leafy greens, soy products, calcium-enriched foods, and others.    Reduce the amount of animal meat and high protein foods in your diet. This may lower your risk of uric acid stones.    Avoid excess sugar (sucrose) and fructose (sweetener in many soft drinks) in your diet.    If you take vitamin C as a supplement, don't take more than 1,000 milligrams (mg) per day.    A dietitian or your healthcare provider can give you ideas on how to change your diet to prevent more kidney stones.  Follow-up care  Follow up with your healthcare provider, or as advised. Even if you don't collect the kidney stone, it is possible to analyze a 24-hour urine collection for the cause of this stone. Discuss this with your healthcare provider.  If you had an X-ray, CT scan, or other diagnostic test, you will be told of any findings that may affect your care.  Call 911  Call 911 if you have any of these:    Weakness, dizziness, or fainting  When to seek medical advice  Call your healthcare provider if any of the these occur:    Severe pain that returns and not relieved by pain medicines    Repeated vomiting or unable to keep down fluids    Fever of 100.4 F (38 C) or higher, or as directed by your healthcare provider    Blood clots in urine    Foul smelling or cloudy urine    Unable to pass urine for 8 hours or increasing bladder pressure  Date Last Reviewed: 10/1/2016    3896-8644 [employer]. [address] [place] [address]. All rights reserved. This information is not intended as a substitute for professional medical care. Always follow your healthcare professional's instructions.           * KIDNEY STONE (w/ Colic)    The sharp cramping pain and nausea/vomiting  "that you have is due to a small stone which has formed in the kidney and is now passing down a narrow tube (ureter) on its way to your bladder. Once it reaches your bladder, the pain will stop. The stone may pass in your urine stream in one piece. [The size may be 1/16\" to 1/4\" (1-6mm)]. Or, the stone may also break up into yelitza fragments which you may not even notice.  Once you have had a kidney stone there is a risk for recurrence in the future.  HOME CARE:    Drink lots of fluid (at least 8-10 glasses of water a day).    Most stones will pass on their own, but may take from a few hours to a few days.    Each time you urinate, do so in a jar. Pour the urine from the jar through the strainer and into the toilet. Continue doing this until 24 hours after your pain stops. By then, if there was a kidney stone, it should pass from your bladder. Some stones dissolve into sand-like particles and pass right through the strainer. In that case, you won't ever see a stone.    Save any stone that you find in the strainer and bring it to your doctor for analysis. It may be possible to prevent certain types of stones from forming. Therefore, it is important to know what kind of stone you have.    Try to stay as active as possible since this will help the stone pass. Do not stay in bed unless your pain prevents you from getting up. You may notice a red, pink or brown color to your urine. This is normal while passing a kidney stone.  FOLLOW UP with your doctor or return to this facility if the pain lasts more than 48 hours.  GET PROMPT MEDICAL ATTENTION if any of the following occur:    Pain that is not controlled by the medicine given    Repeated vomiting or unable to keep down fluids    Weakness, dizziness or fainting    Fever over 101  F (38.3  C)    Passage of solid red or brown urine (can't see through it) or urine with lots of blood clots    Unable to pass urine for 8 hours and increasing bladder pressure    5667-5011 " [employer] [place], [address] [place] [address], [place] [address]. All rights reserved. This information is not intended as a substitute for professional medical care. Always follow your healthcare professional's instructions.

## 2017-07-10 LAB
C TRACH DNA SPEC QL NAA+PROBE: NORMAL
N GONORRHOEA DNA SPEC QL NAA+PROBE: NORMAL
SPECIMEN SOURCE: NORMAL
SPECIMEN SOURCE: NORMAL

## 2019-11-23 ENCOUNTER — ANCILLARY PROCEDURE (OUTPATIENT)
Dept: GENERAL RADIOLOGY | Facility: CLINIC | Age: 24
End: 2019-11-23
Attending: INTERNAL MEDICINE
Payer: COMMERCIAL

## 2019-11-23 ENCOUNTER — OFFICE VISIT (OUTPATIENT)
Dept: URGENT CARE | Facility: URGENT CARE | Age: 24
End: 2019-11-23
Payer: COMMERCIAL

## 2019-11-23 VITALS
TEMPERATURE: 98.4 F | HEIGHT: 72 IN | RESPIRATION RATE: 16 BRPM | OXYGEN SATURATION: 99 % | BODY MASS INDEX: 21.13 KG/M2 | DIASTOLIC BLOOD PRESSURE: 80 MMHG | HEART RATE: 85 BPM | SYSTOLIC BLOOD PRESSURE: 123 MMHG | WEIGHT: 156 LBS

## 2019-11-23 DIAGNOSIS — R05.3 CHRONIC COUGH: ICD-10-CM

## 2019-11-23 DIAGNOSIS — R07.9 LEFT-SIDED CHEST PAIN: ICD-10-CM

## 2019-11-23 DIAGNOSIS — R07.9 CHEST PAIN, UNSPECIFIED TYPE: ICD-10-CM

## 2019-11-23 DIAGNOSIS — R05.3 CHRONIC COUGH: Primary | ICD-10-CM

## 2019-11-23 DIAGNOSIS — R00.2 PALPITATIONS: ICD-10-CM

## 2019-11-23 PROCEDURE — 99215 OFFICE O/P EST HI 40 MIN: CPT | Performed by: INTERNAL MEDICINE

## 2019-11-23 PROCEDURE — 71046 X-RAY EXAM CHEST 2 VIEWS: CPT

## 2019-11-23 PROCEDURE — 93000 ELECTROCARDIOGRAM COMPLETE: CPT | Performed by: INTERNAL MEDICINE

## 2019-11-23 RX ORDER — FLUTICASONE PROPIONATE 50 MCG
1 SPRAY, SUSPENSION (ML) NASAL 2 TIMES DAILY
Qty: 16 G | Refills: 0 | Status: SHIPPED | OUTPATIENT
Start: 2019-11-23 | End: 2019-12-03

## 2019-11-23 ASSESSMENT — ENCOUNTER SYMPTOMS
CHILLS: 0
FATIGUE: 1
PALPITATIONS: 1
ROS GI COMMENTS: DENIES HEARTBURN
DIAPHORESIS: 0
WHEEZING: 0
FEVER: 0
HEADACHES: 0
ADENOPATHY: 0
DYSURIA: 0
SORE THROAT: 0
SHORTNESS OF BREATH: 0
MYALGIAS: 0
ABDOMINAL PAIN: 0
SLEEP DISTURBANCE: 0

## 2019-11-23 ASSESSMENT — MIFFLIN-ST. JEOR: SCORE: 1730.61

## 2019-11-23 NOTE — PROGRESS NOTES
SUBJECTIVE:   Stoney Law is a 24 year old male presenting with a chief complaint of   Chief Complaint   Patient presents with     Cough     since the end of august     Chest Pain     last saturday       He is an established patient of [facility].  Adult  Initially started with cold and now persistent Cough for 3 months  Onset of symptoms was 3 month(s) ago.  Course of illness is same.      Current and Associated symptoms: cough - non-productive  Treatment measures tried include OTC Cough med.      He is a non-smoker.   No hx asthma  1 coffee/day    Review of Systems   Constitutional: Positive for fatigue. Negative for chills, diaphoresis and fever.   HENT: Negative for sore throat.    Respiratory: Negative for shortness of breath and wheezing.         Takes deep breaths at times   Cardiovascular: Positive for chest pain and palpitations.        Chest pain over his heart since last Saturday  consticting heart pain at times  Then feels soreness at other times.  Feels heart beating, heaving regular beating  Not with exercise  Not with breathing  He is worried about his heart   Gastrointestinal: Negative for abdominal pain.        Denies heartburn   Genitourinary: Negative for dysuria.   Musculoskeletal: Negative for myalgias.   Skin: Negative for rash.   Allergic/Immunologic: Negative for environmental allergies.   Neurological: Negative for headaches.   Hematological: Negative for adenopathy.   Psychiatric/Behavioral: Negative for sleep disturbance.        States stress in life is not more than usual       Past Medical History:   Diagnosis Date     Chicken pox 11/07     NO ACTIVE PROBLEMS      Pneumonia, lobar (H) 8/2011     Family History   Problem Relation Age of Onset     Cancer Paternal Grandfather         Leukemia     Cancer - colorectal Maternal Grandmother      Heart Disease No family hx of      Current Outpatient Medications   Medication Sig Dispense Refill     fluticasone (FLONASE) 50 MCG/ACT nasal spray Spray 1  spray into both nostrils 2 times daily for 10 days 16 g 0     Social History     Tobacco Use     Smoking status: Never Smoker     Smokeless tobacco: Never Used   Substance Use Topics     Alcohol use: No       OBJECTIVE  /80 (BP Location: Left arm, Cuff Size: Adult Regular)   Pulse 85   Temp 98.4  F (36.9  C) (Oral)   Resp 16   Ht 1.829 m (6')   Wt 70.8 kg (156 lb)   SpO2 99%   BMI 21.16 kg/m      Physical Exam  Vitals signs reviewed.   Constitutional:       Appearance: Normal appearance.   HENT:      Right Ear: Tympanic membrane normal.      Left Ear: Tympanic membrane normal.      Nose: Nose normal.      Mouth/Throat:      Mouth: Mucous membranes are moist.   Eyes:      Conjunctiva/sclera: Conjunctivae normal.   Cardiovascular:      Rate and Rhythm: Normal rate and regular rhythm.      Pulses: Normal pulses.      Heart sounds: Normal heart sounds.   Pulmonary:      Effort: Pulmonary effort is normal.      Breath sounds: Normal breath sounds. No wheezing or rhonchi.   Chest:      Chest wall: No tenderness.   Abdominal:      Palpations: Abdomen is soft.      Tenderness: There is no abdominal tenderness.   Lymphadenopathy:      Cervical: No cervical adenopathy.   Skin:     Findings: No rash.   Neurological:      Mental Status: He is alert.   Psychiatric:         Mood and Affect: Mood normal.         Behavior: Behavior normal.           EKG-normal sinus rhythm with heart rate of 72 with incomplete right bundle branch block noted in V1 V2.  No ischemic changes.  No arrhythmia noted.    Labs:  No results found for this or any previous visit (from the past 24 hour(s)).    X-Ray was done, my findings are: Chest x-ray is clear.  Heart size is normal.  No pneumothorax no infiltrate noted    ASSESSMENT:      ICD-10-CM    1. Chronic cough R05 XR Chest 2 Views     EKG 12-lead complete w/read - Clinics     fluticasone (FLONASE) 50 MCG/ACT nasal spray    3 months   2. Palpitations R00.2 XR Chest 2 Views     EKG  12-lead complete w/read - Clinics     fluticasone (FLONASE) 50 MCG/ACT nasal spray   3. Left-sided chest pain R07.9 XR Chest 2 Views     EKG 12-lead complete w/read - Clinics     fluticasone (FLONASE) 50 MCG/ACT nasal spray        Medical Decision Making:    Differential Diagnosis:  Cardiac: Acute MI  Chest wall Pain  Pleurisy  GERD  Palpitations/arrhythmia  Pneumonia  Pericarditis  Panic/Anxiety    Serious Comorbid Conditions:  Adult:  None  Reassured patient that EKG does not show any acute MI/ischemic symptoms,  Also normal EKG rules out arrhythmia.  Also no signs of pericarditis    Chest x-ray shows no evidence of pneumonia or pneumothorax    Unable to reproduce pain on exam but temporally it is related to developing cough.  Symptoms could be due to pleurisy or muscle skeletal that I am unable to reproduce    Reassured patient that his heart exam is normal and instructed him on how to take pulse since he had symptoms of heavy heartbeat.  PLAN:  Instructed to take pulse if he feels it is beating fast.  Discussed if he is worried about his heart normal heart rate may be up to 110-120.  If above this consider, would go to ER        Followup:  Recheck in 2 weeks for follow-up of cough and chest pain symptoms      Patient Instructions       EKG shows no evidence of heart ischemia/heart attack symptoms.  No arrhythmia noted on EKG.    Chest x-ray is clear.  Heart size is normal.    Unclear etiology of chest pain symptoms  Potentially related to cough/muscle skeletal chest pain    Please take Aleve twice daily with food for 2 weeks until you see follow-up appointment provider.    Please use Flonase twice daily for 10 days for cough.    Recheck in 2 weeks.

## 2019-11-23 NOTE — PATIENT INSTRUCTIONS
EKG shows no evidence of heart ischemia/heart attack symptoms.  No arrhythmia noted on EKG.    Chest x-ray is clear.  Heart size is normal.    Unclear etiology of chest pain symptoms  Potentially related to cough/muscle skeletal chest pain    Please take Aleve twice daily with food for 2 weeks until you see follow-up appointment provider.    Please use Flonase twice daily for 10 days for cough.    Recheck in 2 weeks.

## 2020-02-16 ENCOUNTER — HEALTH MAINTENANCE LETTER (OUTPATIENT)
Age: 25
End: 2020-02-16

## 2020-09-12 ENCOUNTER — OFFICE VISIT (OUTPATIENT)
Dept: URGENT CARE | Facility: URGENT CARE | Age: 25
End: 2020-09-12
Payer: COMMERCIAL

## 2020-09-12 ENCOUNTER — ANCILLARY PROCEDURE (OUTPATIENT)
Dept: GENERAL RADIOLOGY | Facility: CLINIC | Age: 25
End: 2020-09-12
Attending: FAMILY MEDICINE
Payer: COMMERCIAL

## 2020-09-12 VITALS
HEART RATE: 102 BPM | OXYGEN SATURATION: 98 % | HEIGHT: 72 IN | WEIGHT: 156 LBS | BODY MASS INDEX: 21.13 KG/M2 | DIASTOLIC BLOOD PRESSURE: 81 MMHG | TEMPERATURE: 98.6 F | SYSTOLIC BLOOD PRESSURE: 120 MMHG

## 2020-09-12 DIAGNOSIS — R06.02 SOB (SHORTNESS OF BREATH): Primary | ICD-10-CM

## 2020-09-12 DIAGNOSIS — R07.89 ATYPICAL CHEST PAIN: ICD-10-CM

## 2020-09-12 LAB
ALBUMIN SERPL-MCNC: 4.5 G/DL (ref 3.4–5)
ALP SERPL-CCNC: 86 U/L (ref 40–150)
ALT SERPL W P-5'-P-CCNC: 34 U/L (ref 0–70)
ANION GAP SERPL CALCULATED.3IONS-SCNC: 6 MMOL/L (ref 3–14)
AST SERPL W P-5'-P-CCNC: 21 U/L (ref 0–45)
BASOPHILS # BLD AUTO: 0 10E9/L (ref 0–0.2)
BASOPHILS NFR BLD AUTO: 0.1 %
BILIRUB SERPL-MCNC: 0.8 MG/DL (ref 0.2–1.3)
BUN SERPL-MCNC: 10 MG/DL (ref 7–30)
CALCIUM SERPL-MCNC: 9.6 MG/DL (ref 8.5–10.1)
CHLORIDE SERPL-SCNC: 107 MMOL/L (ref 94–109)
CO2 SERPL-SCNC: 24 MMOL/L (ref 20–32)
CREAT SERPL-MCNC: 0.92 MG/DL (ref 0.66–1.25)
DIFFERENTIAL METHOD BLD: NORMAL
EOSINOPHIL # BLD AUTO: 0 10E9/L (ref 0–0.7)
EOSINOPHIL NFR BLD AUTO: 0.5 %
ERYTHROCYTE [DISTWIDTH] IN BLOOD BY AUTOMATED COUNT: 12 % (ref 10–15)
GFR SERPL CREATININE-BSD FRML MDRD: >90 ML/MIN/{1.73_M2}
GLUCOSE SERPL-MCNC: 108 MG/DL (ref 70–99)
HCT VFR BLD AUTO: 46.6 % (ref 40–53)
HGB BLD-MCNC: 16.3 G/DL (ref 13.3–17.7)
LYMPHOCYTES # BLD AUTO: 1.3 10E9/L (ref 0.8–5.3)
LYMPHOCYTES NFR BLD AUTO: 15.9 %
MCH RBC QN AUTO: 32.3 PG (ref 26.5–33)
MCHC RBC AUTO-ENTMCNC: 35 G/DL (ref 31.5–36.5)
MCV RBC AUTO: 93 FL (ref 78–100)
MONOCYTES # BLD AUTO: 0.5 10E9/L (ref 0–1.3)
MONOCYTES NFR BLD AUTO: 5.8 %
NEUTROPHILS # BLD AUTO: 6.2 10E9/L (ref 1.6–8.3)
NEUTROPHILS NFR BLD AUTO: 77.7 %
PLATELET # BLD AUTO: 269 10E9/L (ref 150–450)
POTASSIUM SERPL-SCNC: 4.3 MMOL/L (ref 3.4–5.3)
PROT SERPL-MCNC: 8.4 G/DL (ref 6.8–8.8)
RBC # BLD AUTO: 5.04 10E12/L (ref 4.4–5.9)
SODIUM SERPL-SCNC: 137 MMOL/L (ref 133–144)
TSH SERPL DL<=0.005 MIU/L-ACNC: 1.06 MU/L (ref 0.4–4)
WBC # BLD AUTO: 7.9 10E9/L (ref 4–11)

## 2020-09-12 PROCEDURE — 36415 COLL VENOUS BLD VENIPUNCTURE: CPT | Performed by: FAMILY MEDICINE

## 2020-09-12 PROCEDURE — 93000 ELECTROCARDIOGRAM COMPLETE: CPT | Performed by: FAMILY MEDICINE

## 2020-09-12 PROCEDURE — 71046 X-RAY EXAM CHEST 2 VIEWS: CPT

## 2020-09-12 PROCEDURE — 80050 GENERAL HEALTH PANEL: CPT | Performed by: FAMILY MEDICINE

## 2020-09-12 PROCEDURE — 99214 OFFICE O/P EST MOD 30 MIN: CPT | Performed by: FAMILY MEDICINE

## 2020-09-12 RX ORDER — ALBUTEROL SULFATE 90 UG/1
2 AEROSOL, METERED RESPIRATORY (INHALATION) EVERY 6 HOURS
Qty: 1 INHALER | Refills: 0 | Status: SHIPPED | OUTPATIENT
Start: 2020-09-12

## 2020-09-12 ASSESSMENT — MIFFLIN-ST. JEOR: SCORE: 1725.61

## 2020-09-12 NOTE — PROGRESS NOTES
SUBJECTIVE:   Stoney Law is a 25 year old male presenting with a chief complaint of cough, SOB and chest pain for 1 year.    Patient states that cough is more dry, will get heavy at times.  Will feel SOB most of the time is associated with cough but once in a while will get SOB before cough.  Patient denies any prior history of asthma, no family history of asthma.  No changes in activity.  No TOB use.    Patient states that chest pain is more left sided, notice numbness in left arm and hand last night.  This lasted for about 2 1/2 hours and patient then fell asleep, this morning has faint residual symptoms on top of hand now.  Chest pain is intermittent, will sometimes get heavy breathing associated with chest pain.    Patient has tried taking cough drops but overall has just waited symptoms out.    Patient had a period of time in the spring which he had stomach cramps but that did seem to resolve already.  Denies any significant heartburn, stomach ache, N/V/D.    Denies any family history of significant CAD.    Had pneumonia when younger, recovered without any problems.      Denies any problems with breathing or chest pain prior to this.  Denies any trauma.  No rash on chest.    PCP - none currently    Past Medical History:   Diagnosis Date     Chicken pox 11/07     NO ACTIVE PROBLEMS      Pneumonia, lobar (H) 8/2011     Current Outpatient Medications   Medication Sig Dispense Refill     albuterol (PROAIR HFA/PROVENTIL HFA/VENTOLIN HFA) 108 (90 Base) MCG/ACT inhaler Inhale 2 puffs into the lungs every 6 hours 1 Inhaler 0     Social History     Tobacco Use     Smoking status: Never Smoker     Smokeless tobacco: Never Used   Substance Use Topics     Alcohol use: No       ROS:  Review of systems negative except as stated above.    OBJECTIVE:  /81   Pulse 102   Temp 98.6  F (37  C) (Oral)   Ht 1.829 m (6')   Wt 70.8 kg (156 lb)   SpO2 98%   BMI 21.16 kg/m    GENERAL APPEARANCE: healthy, alert and no  distress  EYES: EOMI,  PERRL, conjunctiva clear  RESP: lungs clear to auscultation - no rales, rhonchi or wheezes  CV: regular rates and rhythm, normal S1 S2, no murmur noted  ABDOMEN:  soft, nontender, no HSM or masses and bowel sounds normal  Extremities: no peripheral edema or tenderness, peripheral pulses normal  SKIN: no suspicious lesions or rashes  PSYCH: mentation appears normal and affect normal/bright    EKG - NSR, rate 82, no ST c/w ischemia    CXR - no acute infiltrate, no pleural effusion, no pneumothorax personally viewed by me    Results for orders placed or performed in visit on 09/12/20   CBC with platelets and differential     Status: None   Result Value Ref Range    WBC 7.9 4.0 - 11.0 10e9/L    RBC Count 5.04 4.4 - 5.9 10e12/L    Hemoglobin 16.3 13.3 - 17.7 g/dL    Hematocrit 46.6 40.0 - 53.0 %    MCV 93 78 - 100 fl    MCH 32.3 26.5 - 33.0 pg    MCHC 35.0 31.5 - 36.5 g/dL    RDW 12.0 10.0 - 15.0 %    Platelet Count 269 150 - 450 10e9/L    % Neutrophils 77.7 %    % Lymphocytes 15.9 %    % Monocytes 5.8 %    % Eosinophils 0.5 %    % Basophils 0.1 %    Absolute Neutrophil 6.2 1.6 - 8.3 10e9/L    Absolute Lymphocytes 1.3 0.8 - 5.3 10e9/L    Absolute Monocytes 0.5 0.0 - 1.3 10e9/L    Absolute Eosinophils 0.0 0.0 - 0.7 10e9/L    Absolute Basophils 0.0 0.0 - 0.2 10e9/L    Diff Method Automated Method        ASSESSMENT/PLAN:  (R06.02) SOB (shortness of breath)  (primary encounter diagnosis)  Plan: XR Chest 2 Views, CBC with platelets and         differential, Comprehensive metabolic panel         (BMP + Alb, Alk Phos, ALT, AST, Total. Bili,         TP), TSH with free T4 reflex, EKG 12-lead         complete w/read - Clinics, albuterol (PROAIR         HFA/PROVENTIL HFA/VENTOLIN HFA) 108 (90 Base)         MCG/ACT inhaler            (R07.89) Atypical chest pain  Plan: XR Chest 2 Views, CBC with platelets and         differential, Comprehensive metabolic panel         (BMP + Alb, Alk Phos, ALT, AST, Total.  TATI Walsh), EKG 12-lead complete w/read - Clinics            Prolong intermittent symptoms of chest pain, cough and SOB.  Reviewed etiology from cardiac, pulmonary, musculoskeletal and GI.  Reassurance given in regards to normal CXR, EKG and initial labs.  Will follow up on pending labs and notify if any abnormalities.  Due to SOB and cough association, trial of inhaler given - RX albuterol inhaler.    Recommend to establish with PCP for follow up for further evaluation.    Chong Tolbert MD  September 12, 2020 10:24 AM

## 2020-09-12 NOTE — PATIENT INSTRUCTIONS
Okay to try albuterol inhaler to help with cough and shortness of breath      Patient Education     Noncardiac Chest Pain    Based on your visit today, the healthcare provider doesn t know what is causing your chest pain. In most cases, people who come to the emergency department with chest pain don t have a problem with their heart. Instead, the pain is caused by other conditions. It's important for the healthcare team to be sure you are not having a life threatening cause for chest pain such as a heart attack, blood clot in the lungs, collapsed lung, ruptured esophagus, or tearing of the aorta. Once these major causes have been ruled out, you may have further evaluation for non-heart causes of chest pain. These may be problems with the lungs, muscles, bones, digestive tract, nerves, or mental health.  Lung problems    Inflammation around the lungs (pleurisy)    Collapsed lung (pneumothorax)    Fluid around the lungs (pleural effusion)    Lung cancer (a rare cause of chest pain)  Muscle or bone problems    Inflamed cartilage between the ribs (costochondritis)    Fibromyalgia    Rheumatoid arthritis    Chest wall strain  Digestive system problems    Reflux    Stomach ulcer    Spasms of the esophagus    Gall stones    Gallbladder inflammation  Mental health conditions    Panic or anxiety attacks    Emotional distress  Your condition doesn t seem serious and your pain doesn t appear to be coming from your heart. But sometimes the signs of a serious problem take more time to appear. Watch for the warning signs listed below.  Home care  Follow these guidelines when caring for yourself at home:    Rest today and avoid strenuous activity.    Take any prescribed medicine as directed.  Follow-up care  Follow up with your healthcare provider, or as advised, if you don t start to feel better within 24 hours.  When to seek medical advice  Call your healthcare provider right away if any of these occur:    A change in the type  of pain. Call if it feels different, becomes more serious, lasts longer, or begins to spread into your shoulder, arm, neck, jaw, or back.    Shortness of breath    You feel more pain when you breathe    Cough with dark-colored mucus or blood    Weakness, dizziness, or fainting    Fever of 100.4 F (38 C) or higher, or as directed by your healthcare provider    Swelling, pain, or redness in one leg  Date Last Reviewed: 12/1/2016 2000-2019 [employer]. [address] [place] [id number]. All rights reserved. This information is not intended as a substitute for professional medical care. Always follow your healthcare professional's instructions.           Patient Education     Shortness of Breath (Dyspnea)  Shortness of breath is the feeling that you can't catch your breath or get enough air. It is also known as dyspnea.  Dyspnea can be caused by many different conditions. They include:    Acute asthma attack    Worsening of chronic lung diseases such as chronic bronchitis and emphysema    Heart failure. This is when weak heart muscle allows extra fluid to collect in the lungs.    Panic attacks or anxiety. Fear can cause rapid breathing (hyperventilation).    Pneumonia, or an infection in the lung tissue    Exposure to toxic substances, fumes, smoke, or certain medicines    Blood clot in the lung (pulmonary embolism). This is often from a piece of blood clot in a deep vein of the leg (deep vein thrombosis) that breaks off and travels to the lungs.    Heart attack or heart-related chest pain (angina)    Anemia    Collapsed lung (pneumothorax)    Dehydration    Pregnancy  Based on your visit today, the exact cause of your shortness of breath is not certain. Your tests don t show any of the serious causes of dyspnea. You may need other tests to find out if you have a serious problem. It s important to watch for any new symptoms or symptoms that get worse. Follow up with your healthcare provider as  directed.  Home care  Follow these tips to take care of yourself at home:    When your symptoms are better, go back to your usual activities.    If you smoke, you should stop. Join a quit-smoking program or ask your healthcare provider for help.    Eat a healthy diet and get plenty of sleep.    Get regular exercise. Talk with your healthcare provider before starting to exercise, especially if you have other medical problems.    Cut down on the amount of caffeine and stimulants you consume.  Follow-up care  Follow up with your healthcare provider, or as advised.  If tests were done, you will be told if your treatment needs to be changed. You can call as directed for the results.  If an X-ray was taken, a specialist will review it. You will be notified of any new findings that may affect your care.  Call 911  Shortness of breath may be a sign of a serious medical problem. For example, it may be a problem with your heart or lungs. Call 911 if you have worsening shortness of breath or trouble breathing, especially with any of the symptoms below:    Confusion or difficulty waking    Fainting or loss of consciousness.    Fast or irregular heartbeat    Coughing up blood    Pain in your chest, arm, shoulder, neck, or upper back    Sweating  When to seek medical advice  Call your healthcare provider right away if any of these occur:    Slight shortness of breath or wheezing    Redness, pain or swelling in your leg, arm, or other body area    Swelling in both legs or ankles    Fast weight gain    Dizziness or weakness    Fever of 100.4 F (38 C) or higher, or as directed by your healthcare provider  Date Last Reviewed: 6/1/2018 2000-2019 [employer]. [address] [place] [address], [place] [address]. All rights reserved. This information is not intended as a substitute for professional medical care. Always follow your healthcare professional's instructions.           Patient Education     Chronic Cough with Uncertain  Cause (Adult)    Everyone has had a cough as part of the common cold, flu, or bronchitis. This kind of cough occurs along with an achy feeling, low-grade fever, nasal and sinus congestion, and a scratchy or sore throat. This usually gets better in 2 to 3 weeks. A cough that lasts longer than 3 weeks may be due to other causes. Your healthcare provider may refer to this as a chronic cough.  If your cough does not improve over the next 2 weeks, further testing may be needed. Follow up with your healthcare provider as advised. Cough suppressants may be recommended. Based on your exam today, the exact cause of your cough is not certain. Below are some common causes for persistent cough.  Smoker's cough  Smoker s cough doesn t go away. If you continue to smoke, it only gets worse. The cough is from irritation in the air passages. Talk to your healthcare provider about quitting. Medicines or nicotine-replacement products, like gum or the patch, may make quitting easier.  Postnasal drip  A cough that is worse at night may be due to postnasal drip. Excess mucus in the nose drains from the back of your nose to your throat. This triggers the cough reflex. Postnasal drip may be due to a sinus infection or allergy. Common allergens include dust, tobacco smoke (both inhaled and secondhand smoke), environmental pollutants, pollen, mold, pets, cleaning agents, room deodorizers, and chemical fumes. Over-the-counter antihistamines or decongestants may be helpful for allergies. A sinus infection may requires antibiotic treatment. See your healthcare provider if symptoms continue.  Medicines  Certain prescribed medicines can cause a chronic cough in some people:    ACE inhibitors for high blood pressure. These include benazepril, captopril, enalapril, fosinopril, lisinopril, quinapril, ramipril, and others.    Beta-blockers for high blood pressure and other conditions. These include propranolol, atenolol, metoprolol, nadolol, and  others.  Let your healthcare provider know if you are taking any of these. The chronic cough may mean your medicine needs to be changed.  Asthma  Cough may be the only sign of mild asthma. You may have tests to find out if asthma is causing your cough. You may also take asthma medicine on a trial basis.  Acid reflux (heartburn, GERD)  The esophagus is the tube that carries food from the mouth to the stomach. A valve at its lower end prevents stomach acids from flowing upward. If this valve does not work properly, acid from the stomach enters the esophagus. This may cause a burning pain in the upper abdomen or lower chest, belching, or cough. Symptoms are often worse when lying flat. Avoid eating or drinking before bedtime. Try using extra pillows to raise your upper body, or place 4-inch blocks under the head of your bed. You may try an over-the-counter (OTC) antacid or an acid-blocking medicine such as famotidine, cimetidine, ranitidine, esomeprazole, lansoprazole, or omeprazole. Stronger medicines for this condition can be prescribed by your healthcare provider. Ask your healthcare provider which OTC medicine to use. Depending on your current medicines, some OTC medicines may cause drug interactions and should be avoided.  Follow-up care  Follow up with your healthcare provider, or as advised, if your cough does not improve. Further testing may be needed.  Note: If an X-ray was taken, a specialist will review it. You will be notified of any new findings that may affect your care.  When to seek medical advice  Call your healthcare provider right away if any of these occur:    Mild wheezing or difficulty breathing    Fever of 100.4 F (38 C) or higher, or as directed by your healthcare provider    Unexpected weight loss    Coughing up large amounts of colored sputum or blood-tinged sputum    Night sweats (sheets and pajamas get soaking wet)  Call 911  Call 911 if any of these occur:    Coughing up blood    Moderate  to severe trouble breathing or wheezing  Date Last Reviewed: 6/1/2018 2000-2019 [employer]. [address] [place] [address], [place] [address]. All rights reserved. This information is not intended as a substitute for professional medical care. Always follow your healthcare professional's instructions.

## 2020-09-25 ENCOUNTER — HOSPITAL ENCOUNTER (EMERGENCY)
Facility: CLINIC | Age: 25
Discharge: HOME | End: 2020-09-25
Attending: EMERGENCY MEDICINE | Admitting: EMERGENCY MEDICINE
Payer: COMMERCIAL

## 2020-09-25 VITALS
TEMPERATURE: 97.6 F | RESPIRATION RATE: 23 BRPM | WEIGHT: 165 LBS | SYSTOLIC BLOOD PRESSURE: 116 MMHG | OXYGEN SATURATION: 97 % | HEART RATE: 85 BPM | DIASTOLIC BLOOD PRESSURE: 68 MMHG | BODY MASS INDEX: 22.38 KG/M2

## 2020-09-25 DIAGNOSIS — R07.9 CHEST PAIN, UNSPECIFIED TYPE: ICD-10-CM

## 2020-09-25 DIAGNOSIS — F41.9 ANXIETY: ICD-10-CM

## 2020-09-25 LAB
ALBUMIN SERPL-MCNC: 4.3 G/DL (ref 3.4–5)
ALP SERPL-CCNC: 84 U/L (ref 40–150)
ALT SERPL W P-5'-P-CCNC: 38 U/L (ref 0–70)
ANION GAP SERPL CALCULATED.3IONS-SCNC: 8 MMOL/L (ref 3–14)
AST SERPL W P-5'-P-CCNC: 25 U/L (ref 0–45)
BASOPHILS # BLD AUTO: 0 10E9/L (ref 0–0.2)
BASOPHILS NFR BLD AUTO: 0.6 %
BILIRUB SERPL-MCNC: 0.4 MG/DL (ref 0.2–1.3)
BUN SERPL-MCNC: 13 MG/DL (ref 7–30)
CALCIUM SERPL-MCNC: 9.2 MG/DL (ref 8.5–10.1)
CHLORIDE SERPL-SCNC: 109 MMOL/L (ref 94–109)
CO2 SERPL-SCNC: 23 MMOL/L (ref 20–32)
CREAT SERPL-MCNC: 0.91 MG/DL (ref 0.66–1.25)
D DIMER PPP FEU-MCNC: <0.3 UG/ML FEU (ref 0–0.5)
DIFFERENTIAL METHOD BLD: NORMAL
EOSINOPHIL # BLD AUTO: 0 10E9/L (ref 0–0.7)
EOSINOPHIL NFR BLD AUTO: 0 %
ERYTHROCYTE [DISTWIDTH] IN BLOOD BY AUTOMATED COUNT: 11.8 % (ref 10–15)
GFR SERPL CREATININE-BSD FRML MDRD: >90 ML/MIN/{1.73_M2}
GLUCOSE SERPL-MCNC: 112 MG/DL (ref 70–99)
HCT VFR BLD AUTO: 43.9 % (ref 40–53)
HGB BLD-MCNC: 15.6 G/DL (ref 13.3–17.7)
IMM GRANULOCYTES # BLD: 0 10E9/L (ref 0–0.4)
IMM GRANULOCYTES NFR BLD: 0.3 %
INTERPRETATION ECG - MUSE: NORMAL
LYMPHOCYTES # BLD AUTO: 1.6 10E9/L (ref 0.8–5.3)
LYMPHOCYTES NFR BLD AUTO: 24.3 %
MCH RBC QN AUTO: 32.1 PG (ref 26.5–33)
MCHC RBC AUTO-ENTMCNC: 35.5 G/DL (ref 31.5–36.5)
MCV RBC AUTO: 90 FL (ref 78–100)
MONOCYTES # BLD AUTO: 0.5 10E9/L (ref 0–1.3)
MONOCYTES NFR BLD AUTO: 8 %
NEUTROPHILS # BLD AUTO: 4.5 10E9/L (ref 1.6–8.3)
NEUTROPHILS NFR BLD AUTO: 66.8 %
NRBC # BLD AUTO: 0 10*3/UL
NRBC BLD AUTO-RTO: 0 /100
PLATELET # BLD AUTO: 257 10E9/L (ref 150–450)
POTASSIUM SERPL-SCNC: 4 MMOL/L (ref 3.4–5.3)
PROT SERPL-MCNC: 7.9 G/DL (ref 6.8–8.8)
RBC # BLD AUTO: 4.86 10E12/L (ref 4.4–5.9)
SODIUM SERPL-SCNC: 140 MMOL/L (ref 133–144)
TROPONIN I SERPL-MCNC: <0.015 UG/L (ref 0–0.04)
WBC # BLD AUTO: 6.7 10E9/L (ref 4–11)

## 2020-09-25 PROCEDURE — 84484 ASSAY OF TROPONIN QUANT: CPT | Performed by: EMERGENCY MEDICINE

## 2020-09-25 PROCEDURE — 85379 FIBRIN DEGRADATION QUANT: CPT | Performed by: EMERGENCY MEDICINE

## 2020-09-25 PROCEDURE — 25000128 H RX IP 250 OP 636: Performed by: EMERGENCY MEDICINE

## 2020-09-25 PROCEDURE — 85025 COMPLETE CBC W/AUTO DIFF WBC: CPT | Performed by: EMERGENCY MEDICINE

## 2020-09-25 PROCEDURE — 93010 ELECTROCARDIOGRAM REPORT: CPT | Mod: Z6 | Performed by: EMERGENCY MEDICINE

## 2020-09-25 PROCEDURE — 96374 THER/PROPH/DIAG INJ IV PUSH: CPT | Performed by: EMERGENCY MEDICINE

## 2020-09-25 PROCEDURE — 80053 COMPREHEN METABOLIC PANEL: CPT | Performed by: EMERGENCY MEDICINE

## 2020-09-25 PROCEDURE — 93005 ELECTROCARDIOGRAM TRACING: CPT | Performed by: EMERGENCY MEDICINE

## 2020-09-25 PROCEDURE — 99285 EMERGENCY DEPT VISIT HI MDM: CPT | Mod: 25 | Performed by: EMERGENCY MEDICINE

## 2020-09-25 RX ORDER — LORAZEPAM 2 MG/ML
1 INJECTION INTRAMUSCULAR ONCE
Status: COMPLETED | OUTPATIENT
Start: 2020-09-25 | End: 2020-09-25

## 2020-09-25 RX ORDER — HYDROXYZINE HYDROCHLORIDE 25 MG/1
25-50 TABLET, FILM COATED ORAL 3 TIMES DAILY PRN
Qty: 30 TABLET | Refills: 0 | Status: SHIPPED | OUTPATIENT
Start: 2020-09-25 | End: 2020-10-05

## 2020-09-25 RX ADMIN — LORAZEPAM 1 MG: 2 INJECTION INTRAMUSCULAR; INTRAVENOUS at 03:19

## 2020-09-25 ASSESSMENT — ENCOUNTER SYMPTOMS
ABDOMINAL PAIN: 0
COUGH: 1
SHORTNESS OF BREATH: 0
FEVER: 0

## 2020-09-25 NOTE — ED PROVIDER NOTES
"  History     Chief Complaint   Patient presents with     Chest Pain     pain to left side of chest described as \"squeezing my heart;\" has had this pain intermittently for a month; chronic cough for a year; recent cardiac workup negative     HPI  Stoney Law is a 25 year old male who went to us with a chief complaint of left-sided chest pain that is been intermittently present throughout the last month.  He also notes he has had a chronic cough for the last year.  The pain seems to be worse when he is trying to go to bed.  He denies any stimulant or drug abuse.  He takes albuterol which she has been recently prescribed for his cough but no other medications.  He denies any leg swelling, clotting disorders, long plane or car rides or other risk factors for PE.  Pain does not seem to be worse with exertion.  No history of cardiac disease as far as he knows.  He was seen approximately 2 weeks ago for the same pain urgent care.  Work-up at that time was unremarkable.  The labs and x-ray were reviewed today.  He states he does not feel particularly anxious.  He denies any fevers.  He has not been around anyone he knows to have COVID.  I have reviewed the Medications, Allergies, Past Medical and Surgical History, and Social History in the [employer] system.  PAST MEDICAL HISTORY:   Past Medical History:   Diagnosis Date     Chicken pox 11/07     NO ACTIVE PROBLEMS      Pneumonia, lobar (H) 8/2011       PAST SURGICAL HISTORY:   Past Surgical History:   Procedure Laterality Date     none         Past medical history, past surgical history, medications, and allergies were reviewed with the patient. Additional pertinent items: None    FAMILY HISTORY:   Family History   Problem Relation Age of Onset     Cancer Paternal Grandfather         Leukemia     Cancer - colorectal Maternal Grandmother      Heart Disease No family hx of        SOCIAL HISTORY:   Social History     Tobacco Use     Smoking status: Never Smoker     Smokeless " tobacco: Never Used   Substance Use Topics     Alcohol use: No     Social history was reviewed with the patient. Additional pertinent items: None          Patient's Medications   New Prescriptions    HYDROXYZINE (ATARAX) 25 MG TABLET    Take 1-2 tablets (25-50 mg) by mouth 3 times daily as needed for anxiety   Previous Medications    ALBUTEROL (PROAIR HFA/PROVENTIL HFA/VENTOLIN HFA) 108 (90 BASE) MCG/ACT INHALER    Inhale 2 puffs into the lungs every 6 hours   Modified Medications    No medications on file   Discontinued Medications    No medications on file        No Known Allergies     Review of Systems   Constitutional: Negative for fever.   Respiratory: Positive for cough. Negative for shortness of breath.    Cardiovascular: Positive for chest pain.   Gastrointestinal: Negative for abdominal pain.   All other systems reviewed and are negative.    Physical Exam   BP: 128/84  Pulse: 84  Temp: 97.6  F (36.4  C)  Resp: 20  Weight: 74.8 kg (165 lb)  SpO2: 100 %      Physical Exam  Constitutional:       General: He is not in acute distress.     Appearance: He is not diaphoretic.   HENT:      Head: Normocephalic.      Mouth/Throat:      Pharynx: No oropharyngeal exudate.   Eyes:      Extraocular Movements: Extraocular movements intact.   Neck:      Musculoskeletal: Neck supple.   Cardiovascular:      Heart sounds: Normal heart sounds.   Pulmonary:      Effort: No respiratory distress.      Breath sounds: Normal breath sounds.   Abdominal:      General: There is no distension.      Palpations: Abdomen is soft.      Tenderness: There is no abdominal tenderness.   Musculoskeletal:         General: No deformity.   Skin:     General: Skin is dry.   Neurological:      Mental Status: He is alert.      Comments: alert   Psychiatric:         Behavior: Behavior normal.         ED Course        Procedures             EKG Interpretation:      Interpreted by Dashawn Foreman DO  Time reviewed: 0300  Symptoms at time of EKG:  Chest pain  Rhythm: normal sinus   Rate:78Axis: normal  Ectopy: none  Conduction: Incomplete RBBB ST Segments/ T Waves: No ST-T wave changes  Q Waves: none  Comparison to prior: Unchanged    Clinical Impression: Borderline EKG           Results for orders placed or performed during the hospital encounter of 09/25/20 (from the past 24 hour(s))   EKG 12 lead   Result Value Ref Range    Interpretation ECG Click View Image link to view waveform and result    CBC with platelets differential   Result Value Ref Range    WBC 6.7 4.0 - 11.0 10e9/L    RBC Count 4.86 4.4 - 5.9 10e12/L    Hemoglobin 15.6 13.3 - 17.7 g/dL    Hematocrit 43.9 40.0 - 53.0 %    MCV 90 78 - 100 fl    MCH 32.1 26.5 - 33.0 pg    MCHC 35.5 31.5 - 36.5 g/dL    RDW 11.8 10.0 - 15.0 %    Platelet Count 257 150 - 450 10e9/L    Diff Method Automated Method     % Neutrophils 66.8 %    % Lymphocytes 24.3 %    % Monocytes 8.0 %    % Eosinophils 0.0 %    % Basophils 0.6 %    % Immature Granulocytes 0.3 %    Nucleated RBCs 0 0 /100    Absolute Neutrophil 4.5 1.6 - 8.3 10e9/L    Absolute Lymphocytes 1.6 0.8 - 5.3 10e9/L    Absolute Monocytes 0.5 0.0 - 1.3 10e9/L    Absolute Eosinophils 0.0 0.0 - 0.7 10e9/L    Absolute Basophils 0.0 0.0 - 0.2 10e9/L    Abs Immature Granulocytes 0.0 0 - 0.4 10e9/L    Absolute Nucleated RBC 0.0    Comprehensive metabolic panel   Result Value Ref Range    Sodium 140 133 - 144 mmol/L    Potassium 4.0 3.4 - 5.3 mmol/L    Chloride 109 94 - 109 mmol/L    Carbon Dioxide 23 20 - 32 mmol/L    Anion Gap 8 3 - 14 mmol/L    Glucose 112 (H) 70 - 99 mg/dL    Urea Nitrogen 13 7 - 30 mg/dL    Creatinine 0.91 0.66 - 1.25 mg/dL    GFR Estimate >90 >60 mL/min/[1.73_m2]    GFR Estimate If Black >90 >60 mL/min/[1.73_m2]    Calcium 9.2 8.5 - 10.1 mg/dL    Bilirubin Total 0.4 0.2 - 1.3 mg/dL    Albumin 4.3 3.4 - 5.0 g/dL    Protein Total 7.9 6.8 - 8.8 g/dL    Alkaline Phosphatase 84 40 - 150 U/L    ALT 38 0 - 70 U/L    AST 25 0 - 45 U/L   Troponin I    Result Value Ref Range    Troponin I ES <0.015 0.000 - 0.045 ug/L   D dimer quantitative   Result Value Ref Range    D Dimer <0.3 0.0 - 0.50 ug/ml FEU     Medications   LORazepam (ATIVAN) injection 1 mg (1 mg Intravenous Given 9/25/20 0319)      Results for orders placed or performed during the hospital encounter of 09/25/20   CBC with platelets differential     Status: None   Result Value Ref Range    WBC 6.7 4.0 - 11.0 10e9/L    RBC Count 4.86 4.4 - 5.9 10e12/L    Hemoglobin 15.6 13.3 - 17.7 g/dL    Hematocrit 43.9 40.0 - 53.0 %    MCV 90 78 - 100 fl    MCH 32.1 26.5 - 33.0 pg    MCHC 35.5 31.5 - 36.5 g/dL    RDW 11.8 10.0 - 15.0 %    Platelet Count 257 150 - 450 10e9/L    Diff Method Automated Method     % Neutrophils 66.8 %    % Lymphocytes 24.3 %    % Monocytes 8.0 %    % Eosinophils 0.0 %    % Basophils 0.6 %    % Immature Granulocytes 0.3 %    Nucleated RBCs 0 0 /100    Absolute Neutrophil 4.5 1.6 - 8.3 10e9/L    Absolute Lymphocytes 1.6 0.8 - 5.3 10e9/L    Absolute Monocytes 0.5 0.0 - 1.3 10e9/L    Absolute Eosinophils 0.0 0.0 - 0.7 10e9/L    Absolute Basophils 0.0 0.0 - 0.2 10e9/L    Abs Immature Granulocytes 0.0 0 - 0.4 10e9/L    Absolute Nucleated RBC 0.0    Comprehensive metabolic panel     Status: Abnormal   Result Value Ref Range    Sodium 140 133 - 144 mmol/L    Potassium 4.0 3.4 - 5.3 mmol/L    Chloride 109 94 - 109 mmol/L    Carbon Dioxide 23 20 - 32 mmol/L    Anion Gap 8 3 - 14 mmol/L    Glucose 112 (H) 70 - 99 mg/dL    Urea Nitrogen 13 7 - 30 mg/dL    Creatinine 0.91 0.66 - 1.25 mg/dL    GFR Estimate >90 >60 mL/min/[1.73_m2]    GFR Estimate If Black >90 >60 mL/min/[1.73_m2]    Calcium 9.2 8.5 - 10.1 mg/dL    Bilirubin Total 0.4 0.2 - 1.3 mg/dL    Albumin 4.3 3.4 - 5.0 g/dL    Protein Total 7.9 6.8 - 8.8 g/dL    Alkaline Phosphatase 84 40 - 150 U/L    ALT 38 0 - 70 U/L    AST 25 0 - 45 U/L   Troponin I     Status: None   Result Value Ref Range    Troponin I ES <0.015 0.000 - 0.045 ug/L   D dimer  quantitative     Status: None   Result Value Ref Range    D Dimer <0.3 0.0 - 0.50 ug/ml FEU   EKG 12 lead     Status: None (Preliminary result)   Result Value Ref Range    Interpretation ECG Click View Image link to view waveform and result             Assessments & Plan (with Medical Decision Making)   25-year-old male presents to us with a chief complaint of chest pain.  Includes but not limited to dysrhythmia, acute coronary syndrome, PE, anxiety.  EKG and troponin were normal.  D-dimer was negative for PE.  Patient was given a dose of Ativan.  This resolved his symptoms.  He has increase in symptoms when he is laying down trying to go to sleep is consistent with anxiety as well.  We will discharge him with a prescription for hydroxyzine and recommend he get a primary care provider to follow-up with.  Patient is comfortable with discharge home and the plan.  I have reviewed the nursing notes.    I have reviewed the findings, diagnosis, plan and need for follow up with the patient.    New Prescriptions    HYDROXYZINE (ATARAX) 25 MG TABLET    Take 1-2 tablets (25-50 mg) by mouth 3 times daily as needed for anxiety       Final diagnoses:   Chest pain, unspecified type   Anxiety       9/25/2020   [facility], [place], EMERGENCY DEPARTMENT     Dashawn Foreman,   09/25/20 0359

## 2020-09-25 NOTE — ED AVS SNAPSHOT
[facility], [place], Emergency Department  [address]  [place] [address]  Phone:  [phone]  Fax:  [phone]                                    Stoney Law   MRN: [id number]    Department:  [facility], Emergency Department   Date of Visit:  9/25/2020           After Visit Summary Signature Page    I have received my discharge instructions, and my questions have been answered. I have discussed any challenges I see with this plan with the nurse or doctor.    ..........................................................................................................................................  Patient/Patient Representative Signature      ..........................................................................................................................................  Patient Representative Print Name and Relationship to Patient    ..................................................               ................................................  Date                                   Time    ..........................................................................................................................................  Reviewed by Signature/Title    ...................................................              ..............................................  Date                                               Time          22EPIC Rev 08/18

## 2020-11-22 ENCOUNTER — HEALTH MAINTENANCE LETTER (OUTPATIENT)
Age: 25
End: 2020-11-22

## 2021-04-04 ENCOUNTER — HEALTH MAINTENANCE LETTER (OUTPATIENT)
Age: 26
End: 2021-04-04

## 2021-09-19 ENCOUNTER — HEALTH MAINTENANCE LETTER (OUTPATIENT)
Age: 26
End: 2021-09-19

## 2022-05-01 ENCOUNTER — HEALTH MAINTENANCE LETTER (OUTPATIENT)
Age: 27
End: 2022-05-01

## 2022-11-21 ENCOUNTER — HEALTH MAINTENANCE LETTER (OUTPATIENT)
Age: 27
End: 2022-11-21

## 2023-06-02 ENCOUNTER — HEALTH MAINTENANCE LETTER (OUTPATIENT)
Age: 28
End: 2023-06-02

## 2024-11-06 ENCOUNTER — OFFICE VISIT (OUTPATIENT)
Dept: FAMILY MEDICINE | Facility: CLINIC | Age: 29
End: 2024-11-06
Payer: COMMERCIAL

## 2024-11-06 VITALS
WEIGHT: 183.8 LBS | HEIGHT: 72 IN | DIASTOLIC BLOOD PRESSURE: 77 MMHG | SYSTOLIC BLOOD PRESSURE: 121 MMHG | OXYGEN SATURATION: 98 % | TEMPERATURE: 97.5 F | HEART RATE: 100 BPM | BODY MASS INDEX: 24.89 KG/M2 | RESPIRATION RATE: 16 BRPM

## 2024-11-06 DIAGNOSIS — J45.20 MILD INTERMITTENT ASTHMA WITHOUT COMPLICATION: ICD-10-CM

## 2024-11-06 DIAGNOSIS — Z77.120 MOLD EXPOSURE: Primary | ICD-10-CM

## 2024-11-06 PROCEDURE — 36415 COLL VENOUS BLD VENIPUNCTURE: CPT | Performed by: FAMILY MEDICINE

## 2024-11-06 PROCEDURE — 99000 SPECIMEN HANDLING OFFICE-LAB: CPT | Performed by: FAMILY MEDICINE

## 2024-11-06 PROCEDURE — 99202 OFFICE O/P NEW SF 15 MIN: CPT | Performed by: FAMILY MEDICINE

## 2024-11-06 PROCEDURE — G2211 COMPLEX E/M VISIT ADD ON: HCPCS | Performed by: FAMILY MEDICINE

## 2024-11-06 PROCEDURE — 86003 ALLG SPEC IGE CRUDE XTRC EA: CPT | Mod: 90 | Performed by: FAMILY MEDICINE

## 2024-11-06 PROCEDURE — 86001 ALLERGEN SPECIFIC IGG: CPT | Mod: 90 | Performed by: FAMILY MEDICINE

## 2024-11-06 RX ORDER — ALBUTEROL SULFATE 90 UG/1
2 INHALANT RESPIRATORY (INHALATION) EVERY 6 HOURS PRN
Qty: 6.7 G | Refills: 1 | Status: SHIPPED | OUTPATIENT
Start: 2024-11-06

## 2024-11-06 ASSESSMENT — PAIN SCALES - GENERAL: PAINLEVEL_OUTOF10: NO PAIN (0)

## 2024-11-06 ASSESSMENT — ENCOUNTER SYMPTOMS
BACK PAIN: 1
HEADACHES: 1
NERVOUS/ANXIOUS: 1

## 2024-11-06 ASSESSMENT — ANXIETY QUESTIONNAIRES
2. NOT BEING ABLE TO STOP OR CONTROL WORRYING: NOT AT ALL
7. FEELING AFRAID AS IF SOMETHING AWFUL MIGHT HAPPEN: MORE THAN HALF THE DAYS
8. IF YOU CHECKED OFF ANY PROBLEMS, HOW DIFFICULT HAVE THESE MADE IT FOR YOU TO DO YOUR WORK, TAKE CARE OF THINGS AT HOME, OR GET ALONG WITH OTHER PEOPLE?: SOMEWHAT DIFFICULT
7. FEELING AFRAID AS IF SOMETHING AWFUL MIGHT HAPPEN: MORE THAN HALF THE DAYS
IF YOU CHECKED OFF ANY PROBLEMS ON THIS QUESTIONNAIRE, HOW DIFFICULT HAVE THESE PROBLEMS MADE IT FOR YOU TO DO YOUR WORK, TAKE CARE OF THINGS AT HOME, OR GET ALONG WITH OTHER PEOPLE: SOMEWHAT DIFFICULT
GAD7 TOTAL SCORE: 10
3. WORRYING TOO MUCH ABOUT DIFFERENT THINGS: NOT AT ALL
GAD7 TOTAL SCORE: 10
6. BECOMING EASILY ANNOYED OR IRRITABLE: MORE THAN HALF THE DAYS
1. FEELING NERVOUS, ANXIOUS, OR ON EDGE: MORE THAN HALF THE DAYS
GAD7 TOTAL SCORE: 10
5. BEING SO RESTLESS THAT IT IS HARD TO SIT STILL: MORE THAN HALF THE DAYS
4. TROUBLE RELAXING: MORE THAN HALF THE DAYS

## 2024-11-06 ASSESSMENT — PATIENT HEALTH QUESTIONNAIRE - PHQ9
10. IF YOU CHECKED OFF ANY PROBLEMS, HOW DIFFICULT HAVE THESE PROBLEMS MADE IT FOR YOU TO DO YOUR WORK, TAKE CARE OF THINGS AT HOME, OR GET ALONG WITH OTHER PEOPLE: SOMEWHAT DIFFICULT
SUM OF ALL RESPONSES TO PHQ QUESTIONS 1-9: 1
SUM OF ALL RESPONSES TO PHQ QUESTIONS 1-9: 1

## 2024-11-06 NOTE — PROGRESS NOTES
Assessment & Plan     Mold exposure  -Black mold exposure x 8 months, per pt most of it has been removed. Also planning to mold.  -Experiencing allergy symptoms the last few months: mild headache, cough, eye irritation.  -Discussed that we can test for allergy to black mold but there would not be much in terms of other testing. The best way to treat black mold exposure is to remove it or remove yourself from environment containing it. Explained that black mold is an allergen that causes allergy symptoms that he is likely experiencing. Discussed pros and cons for testing for black mold, including cost -pt opted to get tested.   - Stachybotrys Chartarum Mold Panel    Mild intermittent asthma without complication  -Stable, no recent flares  - albuterol (PROAIR HFA/PROVENTIL HFA/VENTOLIN HFA) 108 (90 Base) MCG/ACT inhaler  Dispense: 6.7 g; Refill: 1                  Subjective   Stoney is a 29 year old, presenting for the following health issues:  LAB REQUEST (Exposure to mold), Anxiety, Headache, and Back Pain      11/6/2024     2:45 PM   Additional Questions   Roomed by Elissa GUERRERO     Anxiety  Associated symptoms include headaches.   Headache     Back Pain   Associated symptoms include headaches.   History of Present Illness       Back Pain:  He presents for follow up of back pain. Patient's back pain is a recurring problem.  Location of back pain:  Other  Description of back pain: dull ache and other  Back pain spreads: right side of neck and left side of neck    Since patient first noticed back pain, pain is: unchanged  Does back pain interfere with his job:  No       Mental Health Follow-up:  Patient presents to follow-up on Anxiety.    Patient's anxiety since last visit has been:  No change  The patient is having other symptoms associated with anxiety.  Any significant life events: No  Patient is feeling anxious or having panic attacks.  Patient has no concerns about alcohol or drug use.    Headaches:   Since the  "patient's last clinic visit, headaches are: no change  The patient is getting headaches:  Not to often, every few days for an hour or 2 at a time.  He is able to do normal daily activities when he has a migraine.  The patient is taking the following rescue/relief medications:  No rescue/relief medications   Patient states \"I get no relief\" from the rescue/relief medications.   The patient is taking the following medications to prevent migraines:  No medications to prevent migraines  In the past 4 weeks, the patient has gone to an Urgent Care or Emergency Room 0 times times due to headaches.    Reason for visit:  Lab request for mold  Symptom onset:  More than a month  Symptoms include:  Cough, difficulty concentrating, SOB, eye irritation   He is taking medications regularly.     Black mold exposure x 8 months behind washer and dryer.  Found out today that there was black mold in their apartment.  Hx of asthma, well controlled.   Planning to move out.    Has been experiencing intermittent cough, headaches, difficulty chronic concentrating, watery eyes that makes him think that it is due to the black mold exposure and wants to get tested.           11/6/2024     2:28 PM   ANDREA-7 SCORE   Total Score 10 (moderate anxiety)   Total Score 10        Patient-reported            11/6/2024     2:27 PM   PHQ   PHQ-9 Total Score 1    Q9: Thoughts of better off dead/self-harm past 2 weeks Not at all        Patient-reported                    Objective    /77   Pulse 100   Temp 97.5  F (36.4  C) (Temporal)   Resp 16   Ht 1.829 m (6')   Wt 83.4 kg (183 lb 12.8 oz)   SpO2 98%   BMI 24.93 kg/m    Body mass index is 24.93 kg/m .  Physical Exam               Signed Electronically by: Jorge Lennon DO    "

## 2024-11-09 ENCOUNTER — HEALTH MAINTENANCE LETTER (OUTPATIENT)
Age: 29
End: 2024-11-09

## 2024-11-13 LAB
S CHARTARUM IGE QN: <0.1 KU/L
S CHARTARUM IGG-MCNC: 2.3 MCG/ML